# Patient Record
Sex: FEMALE | Race: WHITE | NOT HISPANIC OR LATINO | Employment: OTHER | URBAN - NONMETROPOLITAN AREA
[De-identification: names, ages, dates, MRNs, and addresses within clinical notes are randomized per-mention and may not be internally consistent; named-entity substitution may affect disease eponyms.]

---

## 2023-05-17 ENCOUNTER — OFFICE VISIT (OUTPATIENT)
Dept: PRIMARY CARE | Facility: CLINIC | Age: 73
End: 2023-05-17
Payer: COMMERCIAL

## 2023-05-17 VITALS
TEMPERATURE: 97.6 F | DIASTOLIC BLOOD PRESSURE: 62 MMHG | WEIGHT: 145 LBS | SYSTOLIC BLOOD PRESSURE: 108 MMHG | BODY MASS INDEX: 26.1 KG/M2 | OXYGEN SATURATION: 100 % | HEART RATE: 79 BPM

## 2023-05-17 DIAGNOSIS — M25.512 ACUTE PAIN OF LEFT SHOULDER: Primary | ICD-10-CM
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
## 2023-05-17 DIAGNOSIS — M79.18 ABDOMINAL MUSCLE PAIN: ICD-10-CM
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
## 2023-05-17 PROCEDURE — 99214 OFFICE O/P EST MOD 30 MIN: CPT | Performed by: FAMILY MEDICINE

## 2023-05-17 PROCEDURE — 1036F TOBACCO NON-USER: CPT | Performed by: FAMILY MEDICINE

## 2023-05-17 PROCEDURE — 1159F MED LIST DOCD IN RCRD: CPT | Performed by: FAMILY MEDICINE

## 2023-05-17 RX ORDER — LANCETS 33 GAUGE
EACH MISCELLANEOUS 2 TIMES DAILY
COMMUNITY
Start: 2023-01-31

## 2023-05-17 RX ORDER — LORATADINE 10 MG/1
10 TABLET ORAL DAILY
COMMUNITY
End: 2023-08-02 | Stop reason: SDUPTHER

## 2023-05-17 RX ORDER — CYCLOBENZAPRINE HCL 5 MG
5 TABLET ORAL DAILY PRN
COMMUNITY
Start: 2023-03-07

## 2023-05-17 RX ORDER — BLOOD-GLUCOSE METER
EACH MISCELLANEOUS 2 TIMES DAILY
COMMUNITY
Start: 2016-11-14 | End: 2023-06-16 | Stop reason: SDUPTHER

## 2023-05-17 RX ORDER — ZINC SULFATE 50(220)MG
220 CAPSULE ORAL
COMMUNITY
End: 2023-05-17 | Stop reason: ALTCHOICE

## 2023-05-17 RX ORDER — METFORMIN HYDROCHLORIDE 500 MG/1
1000 TABLET ORAL
COMMUNITY
Start: 2016-07-08 | End: 2023-07-07 | Stop reason: SDUPTHER

## 2023-05-17 RX ORDER — DIPHENHYDRAMINE HCL 25 MG
25 TABLET ORAL EVERY 6 HOURS PRN
COMMUNITY

## 2023-05-17 RX ORDER — BENZONATATE 200 MG/1
200 CAPSULE ORAL EVERY 8 HOURS PRN
COMMUNITY

## 2023-05-17 RX ORDER — FLUTICASONE PROPIONATE 50 MCG
1 SPRAY, SUSPENSION (ML) NASAL 2 TIMES DAILY
COMMUNITY
Start: 2013-02-26 | End: 2024-03-29 | Stop reason: SDUPTHER

## 2023-05-17 RX ORDER — LUBIPROSTONE 24 UG/1
1 CAPSULE ORAL
COMMUNITY
Start: 2022-04-26

## 2023-05-17 RX ORDER — PROMETHAZINE HYDROCHLORIDE 12.5 MG/1
TABLET ORAL
COMMUNITY
Start: 2022-11-28

## 2023-05-17 ASSESSMENT — ENCOUNTER SYMPTOMS
RECTAL PAIN: 0
ACTIVITY CHANGE: 0
EYE DISCHARGE: 0
BACK PAIN: 1
DIZZINESS: 0
NERVOUS/ANXIOUS: 0
SORE THROAT: 0
DIARRHEA: 0
UNEXPECTED WEIGHT CHANGE: 0
SINUS PRESSURE: 0
JOINT SWELLING: 0
LIGHT-HEADEDNESS: 0
SHORTNESS OF BREATH: 0
SLEEP DISTURBANCE: 0
ARTHRALGIAS: 1
FEVER: 0
WEAKNESS: 0
MYALGIAS: 0
EYE ITCHING: 0
NUMBNESS: 0
WHEEZING: 0
NAUSEA: 0
DYSURIA: 0
FLANK PAIN: 0
VOMITING: 0
RHINORRHEA: 0
APPETITE CHANGE: 0
PALPITATIONS: 0
NECK PAIN: 1
COUGH: 0
HEADACHES: 0
HEMATURIA: 0
DYSPHORIC MOOD: 0
ABDOMINAL PAIN: 1

## 2023-05-17 NOTE — PATIENT INSTRUCTIONS
"FOLLOW UP IF YOU NEED TO   USE ISE ON THE SHOULDER/COULD GO TO HEAT ON 20\" THEN OFF THREE TIMES DAILY    FOLLOW WITH DR MAHAJAN  DUE TO THE PAIN IN YOUR SHOULDER   "
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
## 2023-06-16 DIAGNOSIS — E11.9 TYPE 2 DIABETES MELLITUS WITHOUT COMPLICATION, WITHOUT LONG-TERM CURRENT USE OF INSULIN (MULTI): Primary | ICD-10-CM

## 2023-06-16 RX ORDER — BLOOD-GLUCOSE METER
EACH MISCELLANEOUS
Qty: 60 STRIP | Refills: 3 | Status: SHIPPED | OUTPATIENT
Start: 2023-06-16

## 2023-07-07 DIAGNOSIS — E11.9 TYPE 2 DIABETES MELLITUS WITHOUT COMPLICATION, WITHOUT LONG-TERM CURRENT USE OF INSULIN (MULTI): Primary | ICD-10-CM

## 2023-07-07 RX ORDER — METFORMIN HYDROCHLORIDE 500 MG/1
2500 TABLET ORAL 2 TIMES DAILY
Qty: 450 TABLET | Refills: 1 | Status: SHIPPED | OUTPATIENT
Start: 2023-07-07 | End: 2024-01-31 | Stop reason: SDUPTHER

## 2023-08-02 DIAGNOSIS — T78.40XD ALLERGY, SUBSEQUENT ENCOUNTER: Primary | ICD-10-CM

## 2023-08-02 RX ORDER — LORATADINE 10 MG/1
10 TABLET ORAL DAILY
Qty: 90 TABLET | Refills: 0 | Status: SHIPPED | OUTPATIENT
Start: 2023-08-02 | End: 2024-04-11 | Stop reason: SDUPTHER

## 2023-08-23 ENCOUNTER — APPOINTMENT (OUTPATIENT)
Dept: PRIMARY CARE | Facility: CLINIC | Age: 73
End: 2023-08-23
Payer: COMMERCIAL

## 2023-08-24 ENCOUNTER — OFFICE VISIT (OUTPATIENT)
Dept: PRIMARY CARE | Facility: CLINIC | Age: 73
End: 2023-08-24
Payer: COMMERCIAL

## 2023-08-24 VITALS
TEMPERATURE: 96.7 F | HEART RATE: 87 BPM | BODY MASS INDEX: 25.92 KG/M2 | WEIGHT: 144 LBS | OXYGEN SATURATION: 95 % | SYSTOLIC BLOOD PRESSURE: 130 MMHG | DIASTOLIC BLOOD PRESSURE: 80 MMHG

## 2023-08-24 DIAGNOSIS — M54.50 ACUTE BILATERAL LOW BACK PAIN WITHOUT SCIATICA: ICD-10-CM

## 2023-08-24 LAB
POC APPEARANCE, URINE: CLEAR
POC BILIRUBIN, URINE: ABNORMAL
POC BLOOD, URINE: NEGATIVE
POC COLOR, URINE: YELLOW
POC GLUCOSE, URINE: NEGATIVE MG/DL
POC KETONES, URINE: NEGATIVE MG/DL
POC LEUKOCYTES, URINE: NEGATIVE
POC NITRITE,URINE: NEGATIVE
POC PH, URINE: 5.5 PH
POC PROTEIN, URINE: ABNORMAL MG/DL
POC SPECIFIC GRAVITY, URINE: >=1.03
POC UROBILINOGEN, URINE: 1 EU/DL

## 2023-08-24 PROCEDURE — 81003 URINALYSIS AUTO W/O SCOPE: CPT

## 2023-08-24 PROCEDURE — 1036F TOBACCO NON-USER: CPT

## 2023-08-24 PROCEDURE — 99214 OFFICE O/P EST MOD 30 MIN: CPT

## 2023-08-24 PROCEDURE — 1125F AMNT PAIN NOTED PAIN PRSNT: CPT

## 2023-08-24 PROCEDURE — 1159F MED LIST DOCD IN RCRD: CPT

## 2023-08-24 RX ORDER — AMLODIPINE BESYLATE 2.5 MG/1
2.5 TABLET ORAL DAILY
COMMUNITY

## 2023-08-24 RX ORDER — MELOXICAM 7.5 MG/1
7.5 TABLET ORAL DAILY
Qty: 14 TABLET | Refills: 0 | Status: SHIPPED | OUTPATIENT
Start: 2023-08-24 | End: 2023-09-07
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
## 2023-08-24 NOTE — PATIENT INSTRUCTIONS
Continue muscle relaxant  Meloxicam every day for pain relief  As needed Aleve as well    Thank you for coming in today, if any questions or concerns arise, please call my office.   Jose L Lunsford, MILTON-CNP

## 2023-09-12 ENCOUNTER — OFFICE VISIT (OUTPATIENT)
Dept: PRIMARY CARE | Facility: CLINIC | Age: 73
End: 2023-09-12
Payer: COMMERCIAL

## 2023-09-12 VITALS
TEMPERATURE: 97.5 F | OXYGEN SATURATION: 97 % | HEART RATE: 84 BPM | SYSTOLIC BLOOD PRESSURE: 114 MMHG | DIASTOLIC BLOOD PRESSURE: 66 MMHG | WEIGHT: 144 LBS | BODY MASS INDEX: 24.74 KG/M2

## 2023-09-12 DIAGNOSIS — M54.50 CHRONIC BILATERAL LOW BACK PAIN WITHOUT SCIATICA: ICD-10-CM

## 2023-09-12 DIAGNOSIS — R30.0 DYSURIA: Primary | ICD-10-CM

## 2023-09-12 DIAGNOSIS — G89.29 CHRONIC BILATERAL LOW BACK PAIN WITHOUT SCIATICA: ICD-10-CM

## 2023-09-12 LAB
POC APPEARANCE, URINE: CLEAR
POC BILIRUBIN, URINE: NEGATIVE
POC BLOOD, URINE: NEGATIVE
POC COLOR, URINE: YELLOW
POC GLUCOSE, URINE: ABNORMAL MG/DL
POC KETONES, URINE: ABNORMAL MG/DL
POC LEUKOCYTES, URINE: NEGATIVE
POC NITRITE,URINE: NEGATIVE
POC PH, URINE: 5.5 PH
POC PROTEIN, URINE: NEGATIVE MG/DL
POC SPECIFIC GRAVITY, URINE: >=1.03
POC UROBILINOGEN, URINE: 0.2 EU/DL

## 2023-09-12 PROCEDURE — 1125F AMNT PAIN NOTED PAIN PRSNT: CPT | Performed by: FAMILY MEDICINE

## 2023-09-12 PROCEDURE — 81003 URINALYSIS AUTO W/O SCOPE: CPT | Performed by: FAMILY MEDICINE

## 2023-09-12 PROCEDURE — 99214 OFFICE O/P EST MOD 30 MIN: CPT | Performed by: FAMILY MEDICINE

## 2023-09-12 PROCEDURE — 1036F TOBACCO NON-USER: CPT | Performed by: FAMILY MEDICINE

## 2023-09-12 PROCEDURE — 1159F MED LIST DOCD IN RCRD: CPT | Performed by: FAMILY MEDICINE

## 2023-09-12 RX ORDER — CALCIUM CARBONATE 200(500)MG
TABLET,CHEWABLE ORAL
COMMUNITY

## 2023-09-12 RX ORDER — PROPYLENE GLYCOL 0.06 MG/ML
SOLUTION/ DROPS OPHTHALMIC
COMMUNITY
Start: 2023-07-06

## 2023-09-12 ASSESSMENT — ENCOUNTER SYMPTOMS
EYE ITCHING: 0
SINUS PRESSURE: 0
COUGH: 0
NAUSEA: 0
EYE DISCHARGE: 0
VOMITING: 0
DIARRHEA: 0
WEAKNESS: 0
SLEEP DISTURBANCE: 0
FEVER: 0
DIZZINESS: 0
WHEEZING: 0
HEADACHES: 0
ABDOMINAL PAIN: 0
NERVOUS/ANXIOUS: 0
LIGHT-HEADEDNESS: 0
PALPITATIONS: 0
FLANK PAIN: 0
HEMATURIA: 0
ARTHRALGIAS: 0
UNEXPECTED WEIGHT CHANGE: 0
NUMBNESS: 0
APPETITE CHANGE: 0
RHINORRHEA: 0
DYSURIA: 0
MYALGIAS: 0
JOINT SWELLING: 0
BLOOD IN STOOL: 0
DYSPHORIC MOOD: 0
SORE THROAT: 0
CONSTIPATION: 0
ACTIVITY CHANGE: 0
SHORTNESS OF BREATH: 0
ABDOMINAL DISTENTION: 1
BACK PAIN: 1
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
## 2023-10-04 ENCOUNTER — OFFICE VISIT (OUTPATIENT)
Dept: PRIMARY CARE | Facility: CLINIC | Age: 73
End: 2023-10-04
Payer: COMMERCIAL

## 2023-10-04 VITALS
TEMPERATURE: 97.5 F | WEIGHT: 142 LBS | HEART RATE: 84 BPM | SYSTOLIC BLOOD PRESSURE: 148 MMHG | BODY MASS INDEX: 24.39 KG/M2 | OXYGEN SATURATION: 96 % | DIASTOLIC BLOOD PRESSURE: 62 MMHG

## 2023-10-04 DIAGNOSIS — R82.90 ABNORMAL URINE FINDINGS: ICD-10-CM

## 2023-10-04 DIAGNOSIS — R81 GLYCOSURIA: Primary | ICD-10-CM

## 2023-10-04 LAB
POC APPEARANCE, URINE: CLEAR
POC BILIRUBIN, URINE: NEGATIVE
POC BLOOD, URINE: NEGATIVE
POC COLOR, URINE: YELLOW
POC GLUCOSE, URINE: ABNORMAL MG/DL
POC KETONES, URINE: ABNORMAL MG/DL
POC LEUKOCYTES, URINE: NEGATIVE
POC NITRITE,URINE: NEGATIVE
POC PH, URINE: 5.5 PH
POC PROTEIN, URINE: NEGATIVE MG/DL
POC SPECIFIC GRAVITY, URINE: 1.02
POC UROBILINOGEN, URINE: 0.2 EU/DL

## 2023-10-04 PROCEDURE — 81002 URINALYSIS NONAUTO W/O SCOPE: CPT | Performed by: FAMILY MEDICINE

## 2023-10-04 PROCEDURE — 99213 OFFICE O/P EST LOW 20 MIN: CPT | Performed by: FAMILY MEDICINE

## 2023-10-04 PROCEDURE — 1125F AMNT PAIN NOTED PAIN PRSNT: CPT | Performed by: FAMILY MEDICINE

## 2023-10-04 PROCEDURE — 1159F MED LIST DOCD IN RCRD: CPT | Performed by: FAMILY MEDICINE

## 2023-10-04 PROCEDURE — 1036F TOBACCO NON-USER: CPT | Performed by: FAMILY MEDICINE

## 2023-10-04 RX ORDER — OMEPRAZOLE 20 MG/1
CAPSULE, DELAYED RELEASE ORAL
COMMUNITY
Start: 2023-10-02

## 2023-10-04 RX ORDER — DICYCLOMINE HYDROCHLORIDE 20 MG/1
20 TABLET ORAL DAILY PRN
COMMUNITY
Start: 2023-09-27 | End: 2024-09-26

## 2023-10-04 NOTE — PROGRESS NOTES
"Subjective   Patient ID: Veronique Garcia is a 73 y.o. female who presents for Follow-up (Follow up from Dr. Roger.  Was told something was found in her urine (\"calcium or something like that\")).    HPI URINE CHECKED TODAY IS CLEAR     Review of SystemsNO INTERVAL HISTORICAL CHANGES IN ANY OF THE 12 SYSTEMS REVIEWED OTHER THAN NEEDS TO RECHECK MY URINE AND SHE HAD NO DYSURIA     Objective   /62   Pulse 84   Temp 36.4 °C (97.5 °F)   Wt 64.4 kg (142 lb)   SpO2 96%   BMI 24.39 kg/m²     Physical ExamSINCE RECENT VISIT NO INTERVAL PHYSICAL CHANGES IN ANY OF THE 12 SYSTEMS REVIEWED OTHER THAN SHE IS CONFUSED LIKE USUAL WITH FLIGHT OF IDEAS /NON COMPLETION OF SENTENCES  NO FLANK PAIN NO ABNORMAL UA      Assessment/Plan   Diagnoses and all orders for this visit:  Glycosuria  Abnormal urine findings  -     POCT UA (nonautomated) manually resulted         "

## 2024-01-10 ENCOUNTER — APPOINTMENT (OUTPATIENT)
Dept: PRIMARY CARE | Facility: CLINIC | Age: 74
End: 2024-01-10
Payer: COMMERCIAL

## 2024-01-31 DIAGNOSIS — E11.9 TYPE 2 DIABETES MELLITUS WITHOUT COMPLICATION, WITHOUT LONG-TERM CURRENT USE OF INSULIN (MULTI): ICD-10-CM

## 2024-01-31 RX ORDER — METFORMIN HYDROCHLORIDE 500 MG/1
2500 TABLET ORAL 2 TIMES DAILY
Qty: 450 TABLET | Refills: 1 | Status: SHIPPED | OUTPATIENT
Start: 2024-01-31

## 2024-02-13 ENCOUNTER — OFFICE VISIT (OUTPATIENT)
Dept: PRIMARY CARE | Facility: CLINIC | Age: 74
End: 2024-02-13
Payer: COMMERCIAL

## 2024-02-13 VITALS
DIASTOLIC BLOOD PRESSURE: 60 MMHG | OXYGEN SATURATION: 97 % | TEMPERATURE: 97.2 F | HEART RATE: 87 BPM | WEIGHT: 141 LBS | SYSTOLIC BLOOD PRESSURE: 110 MMHG | BODY MASS INDEX: 24.22 KG/M2

## 2024-02-13 DIAGNOSIS — E11.9 TYPE 2 DIABETES MELLITUS WITHOUT COMPLICATION, WITHOUT LONG-TERM CURRENT USE OF INSULIN (MULTI): ICD-10-CM

## 2024-02-13 LAB — POC HEMOGLOBIN A1C: 9.9 % (ref 4.2–6.5)

## 2024-02-13 PROCEDURE — 99214 OFFICE O/P EST MOD 30 MIN: CPT | Performed by: FAMILY MEDICINE

## 2024-02-13 PROCEDURE — 1159F MED LIST DOCD IN RCRD: CPT | Performed by: FAMILY MEDICINE

## 2024-02-13 PROCEDURE — 3074F SYST BP LT 130 MM HG: CPT | Performed by: FAMILY MEDICINE

## 2024-02-13 PROCEDURE — 1125F AMNT PAIN NOTED PAIN PRSNT: CPT | Performed by: FAMILY MEDICINE

## 2024-02-13 PROCEDURE — 3078F DIAST BP <80 MM HG: CPT | Performed by: FAMILY MEDICINE

## 2024-02-13 PROCEDURE — 1036F TOBACCO NON-USER: CPT | Performed by: FAMILY MEDICINE

## 2024-02-13 PROCEDURE — 83036 HEMOGLOBIN GLYCOSYLATED A1C: CPT | Performed by: FAMILY MEDICINE

## 2024-02-13 RX ORDER — POLYETHYLENE GLYCOL 3350 17 G/17G
POWDER, FOR SOLUTION ORAL
COMMUNITY
Start: 2023-10-05

## 2024-02-15 ASSESSMENT — ENCOUNTER SYMPTOMS
DIARRHEA: 0
RHINORRHEA: 0
CONSTIPATION: 0
BLOOD IN STOOL: 0
EYE DISCHARGE: 0
UNEXPECTED WEIGHT CHANGE: 0
WEAKNESS: 0
FEVER: 0
HEADACHES: 0
COUGH: 0
ACTIVITY CHANGE: 0
NERVOUS/ANXIOUS: 0
DIZZINESS: 0
SHORTNESS OF BREATH: 0
SORE THROAT: 0
FLANK PAIN: 0
VOMITING: 0
PALPITATIONS: 0
DYSURIA: 0
NAUSEA: 0
ARTHRALGIAS: 0
ABDOMINAL PAIN: 0
SLEEP DISTURBANCE: 0
SINUS PRESSURE: 0
HEMATURIA: 0
DYSPHORIC MOOD: 0
MYALGIAS: 0
WHEEZING: 0
LIGHT-HEADEDNESS: 0
APPETITE CHANGE: 0
NUMBNESS: 0
JOINT SWELLING: 0
CONFUSION: 1
EYE ITCHING: 0

## 2024-03-12 ENCOUNTER — TELEPHONE (OUTPATIENT)
Dept: PRIMARY CARE | Facility: CLINIC | Age: 74
End: 2024-03-12

## 2024-03-13 DIAGNOSIS — E11.9 TYPE 2 DIABETES MELLITUS WITHOUT COMPLICATION, WITHOUT LONG-TERM CURRENT USE OF INSULIN (MULTI): Primary | ICD-10-CM

## 2024-03-29 DIAGNOSIS — J30.9 ALLERGIC RHINITIS, UNSPECIFIED SEASONALITY, UNSPECIFIED TRIGGER: Primary | ICD-10-CM

## 2024-03-29 RX ORDER — FLUTICASONE PROPIONATE 50 MCG
1 SPRAY, SUSPENSION (ML) NASAL 2 TIMES DAILY
Qty: 16 G | Refills: 2 | Status: SHIPPED | OUTPATIENT
Start: 2024-03-29

## 2024-04-11 DIAGNOSIS — T78.40XD ALLERGY, SUBSEQUENT ENCOUNTER: ICD-10-CM

## 2024-04-12 RX ORDER — LORATADINE 10 MG/1
10 TABLET ORAL DAILY
Qty: 90 TABLET | Refills: 3 | Status: SHIPPED | OUTPATIENT
Start: 2024-04-12

## 2024-04-25 ENCOUNTER — NUTRITION (OUTPATIENT)
Dept: NUTRITION | Facility: HOSPITAL | Age: 74
End: 2024-04-25
Payer: COMMERCIAL

## 2024-04-25 VITALS — BODY MASS INDEX: 24.31 KG/M2 | WEIGHT: 141.5 LBS

## 2024-04-25 DIAGNOSIS — E11.9 TYPE 2 DIABETES MELLITUS WITHOUT COMPLICATION, WITHOUT LONG-TERM CURRENT USE OF INSULIN (MULTI): Primary | ICD-10-CM

## 2024-04-25 PROCEDURE — 97802 MEDICAL NUTRITION INDIV IN: CPT | Performed by: FAMILY MEDICINE

## 2024-04-25 NOTE — PATIENT INSTRUCTIONS
1.) Follow a Carbohydrate counting diet of no more than 3 servings (45g) of carbohydrates per meal and 1-1.5 servings (15-20g) per snack.   2.) Consume carbohydrates with a higher fiber content like whole grains, beans, nuts, oats, lentils, berries, non-starchy vegetables, leafy greens, fruits with edible skin, and seeds.   3.) Pair Carbohydrate foods with proteins like lean meats, nuts, legumes, eggs, peanut butter, and cottage cheese, greek yogurt.   4. Decrease sugar sweetened beverages and substitute un-sweet/no sugar beverages like water, un-sweet tea, or sugar free beverages.

## 2024-05-15 ENCOUNTER — OFFICE VISIT (OUTPATIENT)
Dept: PRIMARY CARE | Facility: CLINIC | Age: 74
End: 2024-05-15
Payer: COMMERCIAL

## 2024-05-15 VITALS
TEMPERATURE: 98 F | HEART RATE: 92 BPM | BODY MASS INDEX: 24.56 KG/M2 | SYSTOLIC BLOOD PRESSURE: 130 MMHG | WEIGHT: 143 LBS | OXYGEN SATURATION: 97 % | DIASTOLIC BLOOD PRESSURE: 70 MMHG

## 2024-05-15 DIAGNOSIS — E11.9 TYPE 2 DIABETES MELLITUS WITHOUT COMPLICATION, WITHOUT LONG-TERM CURRENT USE OF INSULIN (MULTI): ICD-10-CM

## 2024-05-15 DIAGNOSIS — J40 BRONCHITIS: Primary | ICD-10-CM

## 2024-05-15 LAB — POC HEMOGLOBIN A1C: 10.4 % (ref 4.2–6.5)

## 2024-05-15 PROCEDURE — 3078F DIAST BP <80 MM HG: CPT | Performed by: FAMILY MEDICINE

## 2024-05-15 PROCEDURE — 3075F SYST BP GE 130 - 139MM HG: CPT | Performed by: FAMILY MEDICINE

## 2024-05-15 PROCEDURE — 99214 OFFICE O/P EST MOD 30 MIN: CPT | Performed by: FAMILY MEDICINE

## 2024-05-15 PROCEDURE — 1159F MED LIST DOCD IN RCRD: CPT | Performed by: FAMILY MEDICINE

## 2024-05-15 PROCEDURE — 1036F TOBACCO NON-USER: CPT | Performed by: FAMILY MEDICINE

## 2024-05-15 PROCEDURE — 83036 HEMOGLOBIN GLYCOSYLATED A1C: CPT | Performed by: FAMILY MEDICINE

## 2024-05-15 RX ORDER — MONTELUKAST SODIUM 4 MG/1
4 TABLET, CHEWABLE ORAL NIGHTLY
Qty: 30 TABLET | Refills: 5 | Status: SHIPPED | OUTPATIENT
Start: 2024-05-15 | End: 2024-05-16 | Stop reason: SDUPTHER

## 2024-05-15 ASSESSMENT — ENCOUNTER SYMPTOMS
ARTHRALGIAS: 0
DYSURIA: 0
WEAKNESS: 0
DIZZINESS: 0
APPETITE CHANGE: 0
WHEEZING: 1
DIARRHEA: 0
EYE DISCHARGE: 0
FEVER: 0
HEADACHES: 0
NAUSEA: 0
NERVOUS/ANXIOUS: 0
NUMBNESS: 0
EYE ITCHING: 0
COUGH: 0
CONSTIPATION: 0
RHINORRHEA: 0
ACTIVITY CHANGE: 0
SHORTNESS OF BREATH: 1
MYALGIAS: 0
PALPITATIONS: 0
SORE THROAT: 0
VOMITING: 0
SINUS PRESSURE: 1
UNEXPECTED WEIGHT CHANGE: 0
LIGHT-HEADEDNESS: 0
DYSPHORIC MOOD: 0
HEMATURIA: 0
JOINT SWELLING: 0
SLEEP DISTURBANCE: 0
ABDOMINAL PAIN: 0
BLOOD IN STOOL: 0
FLANK PAIN: 0

## 2024-05-15 NOTE — PATIENT INSTRUCTIONS
Continue great efforts at lower simple carbohydrates   Exercise daily     SEE HERE IN 3 MONTHS FOR THE A1C    Pt here with complaints of throat burning and intermittent sharp pain after eating or drinking. Pt states she is able to swallow food and fluids without issue but states she gets pain shortly afterwards. Pt reports pain started yesterday. Pt is rating pain 5/10 on verbal pain scale.

## 2024-05-15 NOTE — PROGRESS NOTES
Subjective   Patient ID: Veronique Garcia is a 74 y.o. female who presents for Sinus Problem (Nasal drainage, sore throat, ear ache, cough, headaches- initial symptoms started about 1 month ago and some symptoms starting within the last day or so.) and Diabetes (Last A1C was completed on 2/13/24 resulting in 9.9.  Today in office 10.4.).    HPI PATIENT HAS UNCONTROLLED DIABETES   SEEING A DIETICIAN FOR THIS   Measuring foods and different starches like chickpeas     Review of Systems   Constitutional:  Negative for activity change, appetite change, fever and unexpected weight change.   HENT:  Positive for congestion, postnasal drip and sinus pressure. Negative for ear pain, rhinorrhea and sore throat.    Eyes:  Negative for discharge, itching and visual disturbance.   Respiratory:  Positive for shortness of breath and wheezing. Negative for cough.    Cardiovascular:  Negative for chest pain, palpitations and leg swelling.   Gastrointestinal:  Negative for abdominal pain, blood in stool, constipation, diarrhea, nausea and vomiting.   Endocrine: Negative for cold intolerance, heat intolerance and polyuria.   Genitourinary:  Negative for dysuria, flank pain and hematuria.   Musculoskeletal:  Negative for arthralgias, gait problem, joint swelling and myalgias.   Skin:  Negative for rash.   Allergic/Immunologic: Negative for environmental allergies and food allergies.   Neurological:  Negative for dizziness, syncope, weakness, light-headedness, numbness and headaches.   Psychiatric/Behavioral:  Negative for dysphoric mood and sleep disturbance. The patient is not nervous/anxious.        Objective   /70   Pulse 92   Temp 36.7 °C (98 °F)   Wt 64.9 kg (143 lb)   SpO2 97%   BMI 24.56 kg/m²     Physical Exam  Vitals and nursing note reviewed.   Constitutional:       Appearance: Normal appearance.   HENT:      Head: Normocephalic.      Mouth/Throat:      Mouth: Mucous membranes are moist.   Cardiovascular:       Rate and Rhythm: Normal rate and regular rhythm.      Pulses: Normal pulses.      Heart sounds: Normal heart sounds. No murmur heard.     No friction rub. No gallop.   Pulmonary:      Effort: Pulmonary effort is normal. No respiratory distress.      Breath sounds: Wheezing and rhonchi present.   Abdominal:      General: Bowel sounds are normal. There is no distension.      Palpations: Abdomen is soft.      Tenderness: There is no abdominal tenderness.   Musculoskeletal:         General: No deformity. Normal range of motion.   Skin:     General: Skin is warm and dry.      Capillary Refill: Capillary refill takes less than 2 seconds.   Neurological:      General: No focal deficit present.      Mental Status: She is alert and oriented to person, place, and time.   Psychiatric:         Mood and Affect: Mood normal.         Assessment/Plan   Problem List Items Addressed This Visit             ICD-10-CM    Type 2 diabetes mellitus without complication, without long-term current use of insulin (Multi) E11.9    Relevant Orders    POCT glycosylated hemoglobin (Hb A1C) manually resulted (Completed)    Bronchitis - Primary J40    Relevant Medications    montelukast (Singulair) 4 mg chewable tablet

## 2024-05-16 DIAGNOSIS — J40 BRONCHITIS: ICD-10-CM

## 2024-05-17 RX ORDER — MONTELUKAST SODIUM 4 MG/1
4 TABLET, CHEWABLE ORAL NIGHTLY
Qty: 30 TABLET | Refills: 5 | Status: SHIPPED | OUTPATIENT
Start: 2024-05-17 | End: 2024-11-13

## 2024-05-21 ENCOUNTER — APPOINTMENT (OUTPATIENT)
Dept: PRIMARY CARE | Facility: CLINIC | Age: 74
End: 2024-05-21
Payer: COMMERCIAL

## 2024-05-24 ENCOUNTER — OFFICE VISIT (OUTPATIENT)
Dept: PRIMARY CARE | Facility: CLINIC | Age: 74
End: 2024-05-24
Payer: COMMERCIAL

## 2024-05-24 VITALS
HEART RATE: 81 BPM | SYSTOLIC BLOOD PRESSURE: 128 MMHG | WEIGHT: 141 LBS | BODY MASS INDEX: 24.22 KG/M2 | DIASTOLIC BLOOD PRESSURE: 68 MMHG | OXYGEN SATURATION: 96 % | TEMPERATURE: 97.5 F

## 2024-05-24 DIAGNOSIS — Z00.8 ENCOUNTER FOR MEDICAL CLEARANCE FOR PATIENT HOLD: Primary | ICD-10-CM

## 2024-05-24 DIAGNOSIS — S02.5XXS CLOSED FRACTURE OF TOOTH, SEQUELA: ICD-10-CM

## 2024-05-24 DIAGNOSIS — E11.9 TYPE 2 DIABETES MELLITUS WITHOUT COMPLICATION, WITHOUT LONG-TERM CURRENT USE OF INSULIN (MULTI): ICD-10-CM

## 2024-05-24 PROBLEM — S02.5XXA CLOSED FRACTURE OF TOOTH: Status: ACTIVE | Noted: 2024-05-24

## 2024-05-24 PROCEDURE — 1036F TOBACCO NON-USER: CPT | Performed by: FAMILY MEDICINE

## 2024-05-24 PROCEDURE — 99214 OFFICE O/P EST MOD 30 MIN: CPT | Performed by: FAMILY MEDICINE

## 2024-05-24 PROCEDURE — 3078F DIAST BP <80 MM HG: CPT | Performed by: FAMILY MEDICINE

## 2024-05-24 PROCEDURE — 3074F SYST BP LT 130 MM HG: CPT | Performed by: FAMILY MEDICINE

## 2024-05-24 PROCEDURE — 1159F MED LIST DOCD IN RCRD: CPT | Performed by: FAMILY MEDICINE

## 2024-05-24 NOTE — PROGRESS NOTES
Subjective   Patient ID: Veronique Garcia is a 74 y.o. female who presents for dental clearance (Seeing [facility] for a tooth extraction.  Unsure date.  Would like clearance form filled out.).    HPI HAS A BROKEN TOOTH THAT NEEDS PULLED URGENTLY  SHE IS UNCONTROLLED DM AT A1C OF 10  HERE FOR CLEARANCE     Review of SystemsSINCE THE  VISIT NO INTERVAL HISTORICAL CHANGES IN ANY OF THE 12 SYSTEMS REVIEWED OTHER THAN THAT WITH THE LOWER LEFT JAW BROKEN TOOTH     Objective   /68   Pulse 81   Temp 36.4 °C (97.5 °F)   Wt 64 kg (141 lb)   SpO2 96%   BMI 24.22 kg/m²     Physical ExamSINCE RECENT VISIT NO INTERVAL PHYSICAL CHANGES IN ANY OF THE 12 SYSTEMS REVIEWED OTHER THAN WITH THE LOWER LEFT JAW BROKEN TOOTH AND THAT NEEDS URGENTLY PULLED     Assessment/Plan   Problem List Items Addressed This Visit             ICD-10-CM    Type 2 diabetes mellitus without complication, without long-term current use of insulin (Multi) E11.9    Closed fracture of tooth S02.5XXA    Encounter for medical clearance for patient hold - Primary Z00.8

## 2024-06-20 ENCOUNTER — NUTRITION (OUTPATIENT)
Dept: NUTRITION | Facility: HOSPITAL | Age: 74
End: 2024-06-20
Payer: COMMERCIAL

## 2024-06-20 DIAGNOSIS — E11.9 TYPE 2 DIABETES MELLITUS WITHOUT COMPLICATION, WITHOUT LONG-TERM CURRENT USE OF INSULIN (MULTI): Primary | ICD-10-CM

## 2024-06-20 PROCEDURE — 97803 MED NUTRITION INDIV SUBSEQ: CPT | Performed by: FAMILY MEDICINE

## 2024-06-20 NOTE — PROGRESS NOTES
Nutrition Assessment   - FOLLOW UP  Reason for Visit:  Veronique Garcia is a 74 y.o. female who presents for Diabetes follow up    Anthropometrics:  Anthropometrics  IBW/kg (Dietitian Calculated): 54.5 kg ([place])  Percent of IBW: 117 %  Weight Change  Weight History / % Weight Change: No significant weight changes per pt chart  Significant Weight Loss: No       Wt Readings from Last 10 Encounters:   05/24/24 64 kg (141 lb)   05/15/24 64.9 kg (143 lb)   04/25/24 64.2 kg (141 lb 8 oz)   02/13/24 64 kg (141 lb)   10/04/23 64.4 kg (142 lb)   09/12/23 65.3 kg (144 lb)   08/24/23 65.3 kg (144 lb)   05/17/23 65.8 kg (145 lb)   01/31/23 62.8 kg (138 lb 7 oz)   10/18/22 64.9 kg (143 lb)       Food And Nutrient Intake:  Food and Nutrient History  Food and Nutrient History: Pt states that she is doing well with the CHO counting diet. She bought a new set of measuring spoons and cups and have been measuring out all of her food to serving size. She also states that she follows alot of the education that we talked about in the intitial assessment and uses it as a recource. A1c as of 5/15 was 10.4 and a previous A1c of 9.9 4 months ago. Pt has a tooth problem, and provider believe A1c can be higher due to this per pt. She is going back in August to have another lab drawn. Pt check BG at least 1x/day, and todays BG was 243 fasting prior to breakfast.  Energy Intake: Good > 75 %  Fluid Intake: Primarily water, 1 glass of homemade lemonade with no added sugar.  Food Allergy: NKFA  Food Intolerance: Milk/dairy  GI Symptoms: none     Food Intake  Meal 1: Breakfast- 1 egg + 1 slice of toast + butter + 1 piece of vila  Meal 2: Lunch- piece of fruit and 1/2 cup of vegetables  Meal 3: Dinner- Last night was a small piece of lasagne with a side salad  Snacks: Snacks- a sweet like cake.    Food Preparation  Cooking: Patient  Grocery Shopping: Patient  Dining Out: More than 3 times a week  Other: Watches serving sizes when she is out to  eat.     Micronutrient Intake  Vitamin Intake: Multivitamin     Medication and Complementary/Alternative Medicine Use  Prescription Medication Use: Metformin, Norvasc, Amitiza, Prilosec    Physical Activities:  Physical Activity  Physical Activity History: Pt reports going to the [facility] to walk on the track. She tries to go every day or walk outside for 30 minutes.  Consistency: Yes     Nutrition Focused Physical Exam:  Subcutaneous Fat Loss  Orbital Fat Pads: Defer (See NFPE from initial visit. No weight changes.)  Muscle Wasting  Temporalis: Defer (See NFPE from initial visit. No weight changes.)      Energy Needs  Estimated Energy Needs  Total Energy Estimated Needs (kCal): 1600 kCal  Method for Estimating Needs: 25kcal/kg  Estimated Fluid Needs  Total Fluid Estimated Needs (mL): 1600 mL  Method for Estimating Needs: 1ml/kcal  Estimated Protein Needs  Total Protein Estimated Needs (g): 58 g  Method for Estimating Needs: 0.8-1.0g/kg actual bodyweight      Nutrition Diagnosis      Nutrition Diagnosis  Patient has Nutrition Diagnosis: Yes  Diagnosis Status (1): Ongoing  Nutrition Diagnosis 1: Excessive carbohydrate intake  Related to (1): physiological causes requiring use of modified carbohydrate intake  As Evidenced by (1): diet recall and A1c of 10.4.  Additional Nutrition Diagnosis: Diagnosis 2  Diagnosis Status (2): Resolved  Nutrition Diagnosis 2: Food and nutrition related knowledge deficit  Related to (2): limited prior nutrition related education  As Evidenced by (2): diet recall and A1c of 10.4.    Nutrition Interventions/Recommendations   Nutrition Prescription  Individualized Nutrition Prescription Provided for : Carbohydrate, Protein, and Fiber modified diet.  Food and Nutrition Delivery  Meals & Snacks: Carbohydrate-modified diet, Fiber-modified diet, Protein-modified diet  Goals: Pt will follow CHO counting/controlled diet, pairing CHO with protien and higher fiber sources of CHO.  Nutrition  Education  Nutrition Education Content: Content related nutrition education  Goals: Patient goals- 1. Continue to follow portion sizes for CHO counting. Consume no more than 45-60g CHO per meals. 2. Reduce sugary sweets to 2x/week following portion sizes.      Nutrition Monitoring and Evaluation   Food/Nutrient Related History Monitoring  Monitoring and Evaluation Plan: Carbohydrate intake, Fiber intake, Protein intake  Estimated protein intake: Estimated protein intake  Criteria: Pt will pair carbohydrate foods with protein foods to reduce BG spike.  Estimated carbohydrate intake: Estimated carbohydrate intake  Criteria: Pt will consume no more than 3-4 servings (45-60g) carbohydrates per meal, and no more then 1-1.5 servings (15-20g) carbohydrates per snack.  Estimated fiber intake: Estimated fiber intake  Criteria: Pt will consume higher fiber carbohydrate food options to reduce BG spike.  Biochemical Data, Medical Tests and Procedures  Monitoring and Evaluation Plan: Glucose/endocrine profile  Glucose/Endocrine Profile: Glucose, casual, Glucose, fasting, Hemoglobin A1c (HgbA1c)  Criteria: Labs WNL      Time Spent  Time spent directly with patient, family or caregiver: 30 minutes  Documentation Time: 15 minutes      Readiness to Change : Good  Level of Understanding : Good  Anticipated Compliant : Good

## 2024-07-10 DIAGNOSIS — E11.9 TYPE 2 DIABETES MELLITUS WITHOUT COMPLICATION, WITHOUT LONG-TERM CURRENT USE OF INSULIN (MULTI): ICD-10-CM

## 2024-07-10 RX ORDER — BLOOD-GLUCOSE METER
EACH MISCELLANEOUS
Qty: 60 STRIP | Refills: 3 | Status: SHIPPED | OUTPATIENT
Start: 2024-07-10

## 2024-08-20 DIAGNOSIS — E11.9 TYPE 2 DIABETES MELLITUS WITHOUT COMPLICATION, WITHOUT LONG-TERM CURRENT USE OF INSULIN (MULTI): ICD-10-CM

## 2024-08-20 RX ORDER — METFORMIN HYDROCHLORIDE 500 MG/1
2500 TABLET ORAL 2 TIMES DAILY
Qty: 450 TABLET | Refills: 1 | Status: SHIPPED | OUTPATIENT
Start: 2024-08-20

## 2024-08-21 ENCOUNTER — APPOINTMENT (OUTPATIENT)
Dept: PRIMARY CARE | Facility: CLINIC | Age: 74
End: 2024-08-21
Payer: COMMERCIAL

## 2024-08-21 VITALS
TEMPERATURE: 96.9 F | WEIGHT: 140 LBS | BODY MASS INDEX: 24.05 KG/M2 | DIASTOLIC BLOOD PRESSURE: 84 MMHG | SYSTOLIC BLOOD PRESSURE: 156 MMHG | OXYGEN SATURATION: 96 % | HEART RATE: 58 BPM

## 2024-08-21 DIAGNOSIS — E11.9 TYPE 2 DIABETES MELLITUS WITHOUT COMPLICATION, WITHOUT LONG-TERM CURRENT USE OF INSULIN (MULTI): ICD-10-CM

## 2024-08-21 LAB — POC HEMOGLOBIN A1C: 9.3 % (ref 4.2–6.5)

## 2024-08-21 PROCEDURE — 3079F DIAST BP 80-89 MM HG: CPT | Performed by: FAMILY MEDICINE

## 2024-08-21 PROCEDURE — 83036 HEMOGLOBIN GLYCOSYLATED A1C: CPT | Performed by: FAMILY MEDICINE

## 2024-08-21 PROCEDURE — 99214 OFFICE O/P EST MOD 30 MIN: CPT | Performed by: FAMILY MEDICINE

## 2024-08-21 PROCEDURE — 1159F MED LIST DOCD IN RCRD: CPT | Performed by: FAMILY MEDICINE

## 2024-08-21 PROCEDURE — 3077F SYST BP >= 140 MM HG: CPT | Performed by: FAMILY MEDICINE

## 2024-08-21 PROCEDURE — 1036F TOBACCO NON-USER: CPT | Performed by: FAMILY MEDICINE

## 2024-08-21 ASSESSMENT — ENCOUNTER SYMPTOMS
HEMATURIA: 0
JOINT SWELLING: 0
VOMITING: 0
APPETITE CHANGE: 0
SINUS PRESSURE: 0
DYSPHORIC MOOD: 0
SLEEP DISTURBANCE: 0
EYE ITCHING: 0
DYSURIA: 0
SORE THROAT: 0
WEAKNESS: 0
ACTIVITY CHANGE: 0
WHEEZING: 0
SHORTNESS OF BREATH: 0
MYALGIAS: 0
DIZZINESS: 0
ARTHRALGIAS: 0
BLOOD IN STOOL: 0
CONSTIPATION: 0
HEADACHES: 0
FEVER: 0
ABDOMINAL PAIN: 0
PALPITATIONS: 0
NERVOUS/ANXIOUS: 0
FLANK PAIN: 0
RHINORRHEA: 0
NAUSEA: 0
EYE DISCHARGE: 0
COUGH: 0
UNEXPECTED WEIGHT CHANGE: 0
DIARRHEA: 0
NUMBNESS: 0
LIGHT-HEADEDNESS: 0

## 2024-08-21 NOTE — PROGRESS NOTES
Subjective   Patient ID: Veronique Garcia is a 74 y.o. female who presents for Diabetes (Last A1C was completed on 5/15/24 resulting in 10.4.  Today in office 9.3.).    HPI SO SLIGHT IMPROVEMENT IN HER A1C TO 9.3   BMI IS 24 AND SHE LOST 3-4 POUNDS   SHE IS NOT VERY ACTIVE   ONLY TAKES METFORMIN   A GLIPTIN  WOULD BE BENEFICIAL FOR HER CARDIAC AND RENAL STATUS     Review of Systems   Constitutional:  Negative for activity change, appetite change, fever and unexpected weight change.   HENT:  Positive for dental problem. Negative for congestion, ear pain, postnasal drip, rhinorrhea, sinus pressure and sore throat.    Eyes:  Negative for discharge, itching and visual disturbance.   Respiratory:  Negative for cough, shortness of breath and wheezing.    Cardiovascular:  Negative for chest pain, palpitations and leg swelling.   Gastrointestinal:  Negative for abdominal pain, blood in stool, constipation, diarrhea, nausea and vomiting.   Endocrine: Negative for cold intolerance, heat intolerance and polyuria.   Genitourinary:  Negative for dysuria, flank pain and hematuria.   Musculoskeletal:  Negative for arthralgias, gait problem, joint swelling and myalgias.   Skin:  Negative for rash.   Allergic/Immunologic: Negative for environmental allergies and food allergies.   Neurological:  Negative for dizziness, syncope, weakness, light-headedness, numbness and headaches.   Psychiatric/Behavioral:  Negative for dysphoric mood and sleep disturbance. The patient is not nervous/anxious.        Objective   /84   Pulse 58   Temp 36.1 °C (96.9 °F)   Wt 63.5 kg (140 lb)   SpO2 96%   BMI 24.05 kg/m²     Physical Exam  Vitals and nursing note reviewed.   Constitutional:       Appearance: Normal appearance.   HENT:      Head: Normocephalic.   Cardiovascular:      Rate and Rhythm: Normal rate and regular rhythm.      Pulses: Normal pulses.      Heart sounds: Normal heart sounds. No murmur heard.     No friction rub. No  gallop.   Pulmonary:      Effort: Pulmonary effort is normal. No respiratory distress.      Breath sounds: Normal breath sounds. No wheezing.   Abdominal:      General: Bowel sounds are normal. There is no distension.      Palpations: Abdomen is soft.      Tenderness: There is no abdominal tenderness.   Musculoskeletal:         General: No deformity. Normal range of motion.   Skin:     General: Skin is warm and dry.      Capillary Refill: Capillary refill takes less than 2 seconds.   Neurological:      General: No focal deficit present.      Mental Status: She is alert and oriented to person, place, and time.   Psychiatric:         Mood and Affect: Mood normal.         Assessment/Plan   Problem List Items Addressed This Visit             ICD-10-CM    Type 2 diabetes mellitus without complication, without long-term current use of insulin (Multi) E11.9    Relevant Medications    SITagliptin phosphate (Januvia) 25 mg tablet    Other Relevant Orders    POCT glycosylated hemoglobin (Hb A1C) manually resulted (Completed)

## 2024-09-12 ENCOUNTER — NUTRITION (OUTPATIENT)
Dept: NUTRITION | Facility: HOSPITAL | Age: 74
End: 2024-09-12
Payer: COMMERCIAL

## 2024-09-12 DIAGNOSIS — E11.9 TYPE 2 DIABETES MELLITUS WITHOUT COMPLICATION, WITHOUT LONG-TERM CURRENT USE OF INSULIN (MULTI): Primary | ICD-10-CM

## 2024-09-12 PROCEDURE — 97803 MED NUTRITION INDIV SUBSEQ: CPT | Performed by: FAMILY MEDICINE

## 2024-09-12 NOTE — PROGRESS NOTES
Nutrition Assessment   Follow Up  Reason for Visit:  Veronique Garcia is a 74 y.o. female who presents for Diabetes (Follow Up)    Anthropometrics:  Anthropometrics  IBW/kg (Dietitian Calculated): 52.3 kg  Percent of IBW: 121 %  Weight Change  Weight History / % Weight Change: No significant weight changes per pt chart      Wt Readings from Last 10 Encounters:   08/21/24 63.5 kg (140 lb)   05/24/24 64 kg (141 lb)   05/15/24 64.9 kg (143 lb)   04/25/24 64.2 kg (141 lb 8 oz)   02/13/24 64 kg (141 lb)   10/04/23 64.4 kg (142 lb)   09/12/23 65.3 kg (144 lb)   08/24/23 65.3 kg (144 lb)   05/17/23 65.8 kg (145 lb)   01/31/23 62.8 kg (138 lb 7 oz)       Food And Nutrient Intake:  Food and Nutrient History  Food and Nutrient History: Pt reports that she is still doing well with measuring out foods and following carb counting. Pt had a decrease in A1c from 10.4 to 9.3 in 3 monhts. Pt sees provider in November to check again. Pt reports fasting BG is usually around 120. Pt started taking Januvia along with metformin after seeing provider in August.  Energy Intake: Good > 75 %  Fluid Intake: Primarily water, 1 glass of homemade lemonade with no added sugar.  GI Symptoms: none     Food Intake  Meal 1: Eggs, toast  Meal 2: Spaghetti, ground beef  Meal 3: Stuffed peppers    Food Preparation  Cooking: Patient  Grocery Shopping: Patient           Medication and Complementary/Alternative Medicine Use  Prescription Medication Use: Metformin, Norvasc, Amitiza, Prilosec, Januvia     Energy Needs  Estimated Energy Needs  Total Energy Estimated Needs (kCal): 1600 kCal  Method for Estimating Needs: 25kcal/kg  Estimated Fluid Needs  Total Fluid Estimated Needs (mL): 1600 mL  Method for Estimating Needs: 1ml/kcal  Estimated Protein Needs  Total Protein Estimated Needs (g): 58 g  Method for Estimating Needs: 0.8-1.0g/kg actual bodyweight      Nutrition Diagnosis      Nutrition Diagnosis  Patient has Nutrition Diagnosis: Yes  Diagnosis  Status (1): Ongoing  Nutrition Diagnosis 1: Excessive carbohydrate intake (Improving)  Related to (1): physiological causes requiring use of modified carbohydrate intake  As Evidenced by (1): diet recall and A1c of 9.3.    Nutrition Interventions/Recommendations   Nutrition Prescription  Individualized Nutrition Prescription Provided for : Carbohydrate, Protein, and Fiber modified diet.  Food and Nutrition Delivery  Meals & Snacks: Carbohydrate-modified diet, Fiber-modified diet, Protein-modified diet  Goals: Pt will follow CHO counting/controlled diet, pairing CHO with protien and higher fiber sources of CHO.  Nutrition Education  Nutrition Education Content: Content related nutrition education  Goals: Patient goals- 1. Continue to follow portion sizes for CHO counting. Consume no more than 45-60g CHO per meals. 2. Reduce sugary sweets to 2x/week following portion sizes.      Nutrition Monitoring and Evaluation   Food/Nutrient Related History Monitoring  Monitoring and Evaluation Plan: Carbohydrate intake, Fiber intake, Protein intake  Estimated protein intake: Estimated protein intake  Criteria: Pt will pair carbohydrate foods with protein foods to reduce BG spike.  Estimated carbohydrate intake: Estimated carbohydrate intake  Criteria: Pt will consume no more than 3-4 servings (45-60g) carbohydrates per meal, and no more then 1-1.5 servings (15-20g) carbohydrates per snack.  Criteria: Pt will consume higher fiber carbohydrate food options to reduce BG spike.  Biochemical Data, Medical Tests and Procedures  Monitoring and Evaluation Plan: Glucose/endocrine profile  Glucose/Endocrine Profile: Glucose, casual, Hemoglobin A1c (HgbA1c), Glucose, fasting  Criteria: Labs WNL      Time Spent  Time spent directly with patient, family or caregiver: 35 minutes  Documentation Time: 20 minutes        Readiness to Change : Good  Level of Understanding : Good  Anticipated Compliant : Good

## 2024-10-02 ENCOUNTER — APPOINTMENT (OUTPATIENT)
Dept: PRIMARY CARE | Facility: CLINIC | Age: 74
End: 2024-10-02
Payer: COMMERCIAL

## 2024-10-08 ENCOUNTER — APPOINTMENT (OUTPATIENT)
Dept: PRIMARY CARE | Facility: CLINIC | Age: 74
End: 2024-10-08
Payer: COMMERCIAL

## 2024-10-08 VITALS
WEIGHT: 142 LBS | OXYGEN SATURATION: 97 % | SYSTOLIC BLOOD PRESSURE: 130 MMHG | DIASTOLIC BLOOD PRESSURE: 72 MMHG | BODY MASS INDEX: 24.39 KG/M2 | HEART RATE: 95 BPM | TEMPERATURE: 97.7 F

## 2024-10-08 DIAGNOSIS — J01.00 ACUTE NON-RECURRENT MAXILLARY SINUSITIS: Primary | ICD-10-CM

## 2024-10-08 PROCEDURE — 1159F MED LIST DOCD IN RCRD: CPT | Performed by: FAMILY MEDICINE

## 2024-10-08 PROCEDURE — 99214 OFFICE O/P EST MOD 30 MIN: CPT | Performed by: FAMILY MEDICINE

## 2024-10-08 PROCEDURE — 3075F SYST BP GE 130 - 139MM HG: CPT | Performed by: FAMILY MEDICINE

## 2024-10-08 PROCEDURE — 3078F DIAST BP <80 MM HG: CPT | Performed by: FAMILY MEDICINE

## 2024-10-08 RX ORDER — DOXYCYCLINE 100 MG/1
100 CAPSULE ORAL 2 TIMES DAILY
Qty: 20 CAPSULE | Refills: 0 | Status: SHIPPED | OUTPATIENT
Start: 2024-10-08 | End: 2024-10-18

## 2024-10-08 NOTE — PROGRESS NOTES
Subjective   Patient ID: Veronique Garcia is a 74 y.o. female who presents for Sinus Problem (SORE THROAT, EAR DISCOMFORT, AND NASAL DRAINAGE X 1 MONTH.).    HPI PATIENT WITH CHRONIC BRONCHITIS  AND SEASONAL ALLERGIES     Review of Systems   Constitutional:  Negative for activity change, appetite change, fever and unexpected weight change.   HENT:  Positive for congestion, postnasal drip, rhinorrhea, sinus pressure and sinus pain. Negative for ear pain and sore throat.    Eyes:  Negative for discharge, itching and visual disturbance.   Respiratory:  Positive for cough and shortness of breath. Negative for wheezing.    Cardiovascular:  Negative for chest pain, palpitations and leg swelling.   Gastrointestinal:  Negative for abdominal pain, blood in stool, constipation, diarrhea, nausea and vomiting.   Endocrine: Negative for cold intolerance, heat intolerance and polyuria.   Genitourinary:  Negative for dysuria, flank pain and hematuria.   Musculoskeletal:  Negative for arthralgias, gait problem, joint swelling and myalgias.   Skin:  Negative for rash.   Allergic/Immunologic: Negative for environmental allergies and food allergies.   Neurological:  Negative for dizziness, syncope, weakness, light-headedness, numbness and headaches.   Psychiatric/Behavioral:  Negative for dysphoric mood and sleep disturbance. The patient is not nervous/anxious.        Objective   /72   Pulse 95   Temp 36.5 °C (97.7 °F)   Wt 64.4 kg (142 lb)   SpO2 97%   BMI 24.39 kg/m²     Physical Exam  Vitals and nursing note reviewed.   Constitutional:       Appearance: Normal appearance.   HENT:      Head: Normocephalic.      Nose: Congestion present.      Comments: ERYTHEMA  NASAL MUCOSA      Mouth/Throat:      Mouth: Mucous membranes are moist.      Pharynx: Posterior oropharyngeal erythema present.   Neck:      Comments: GLANDS TENDER IN HER NECK   Cardiovascular:      Rate and Rhythm: Normal rate and regular rhythm.      Pulses:  Normal pulses.      Heart sounds: Normal heart sounds. No murmur heard.     No friction rub. No gallop.   Pulmonary:      Effort: Pulmonary effort is normal. No respiratory distress.      Breath sounds: Normal breath sounds. No wheezing.   Abdominal:      General: Bowel sounds are normal. There is no distension.      Palpations: Abdomen is soft.      Tenderness: There is no abdominal tenderness.   Musculoskeletal:         General: No deformity. Normal range of motion.   Lymphadenopathy:      Cervical: Cervical adenopathy present.   Skin:     General: Skin is warm and dry.      Capillary Refill: Capillary refill takes less than 2 seconds.   Neurological:      General: No focal deficit present.      Mental Status: She is alert and oriented to person, place, and time.   Psychiatric:         Mood and Affect: Mood normal.         Assessment/Plan   Problem List Items Addressed This Visit    None  Visit Diagnoses         Codes    Acute non-recurrent maxillary sinusitis    -  Primary J01.00    Relevant Medications    doxycycline (Vibramycin) 100 mg capsule

## 2024-10-10 ASSESSMENT — ENCOUNTER SYMPTOMS
APPETITE CHANGE: 0
NAUSEA: 0
LIGHT-HEADEDNESS: 0
DIZZINESS: 0
DYSURIA: 0
MYALGIAS: 0
EYE DISCHARGE: 0
WHEEZING: 0
NERVOUS/ANXIOUS: 0
ACTIVITY CHANGE: 0
BLOOD IN STOOL: 0
HEADACHES: 0
ARTHRALGIAS: 0
DIARRHEA: 0
ABDOMINAL PAIN: 0
EYE ITCHING: 0
SORE THROAT: 0
WEAKNESS: 0
VOMITING: 0
PALPITATIONS: 0
CONSTIPATION: 0
SINUS PAIN: 1
SLEEP DISTURBANCE: 0
FLANK PAIN: 0
SINUS PRESSURE: 1
RHINORRHEA: 1
HEMATURIA: 0
SHORTNESS OF BREATH: 1
DYSPHORIC MOOD: 0
FEVER: 0
JOINT SWELLING: 0
NUMBNESS: 0
UNEXPECTED WEIGHT CHANGE: 0
COUGH: 1

## 2024-11-06 ENCOUNTER — OFFICE VISIT (OUTPATIENT)
Dept: PRIMARY CARE | Facility: CLINIC | Age: 74
End: 2024-11-06
Payer: COMMERCIAL

## 2024-11-06 VITALS
WEIGHT: 144 LBS | HEART RATE: 68 BPM | OXYGEN SATURATION: 97 % | BODY MASS INDEX: 24.74 KG/M2 | DIASTOLIC BLOOD PRESSURE: 70 MMHG | SYSTOLIC BLOOD PRESSURE: 132 MMHG | TEMPERATURE: 97.2 F

## 2024-11-06 DIAGNOSIS — R14.3 FLATULENCE: Primary | ICD-10-CM

## 2024-11-06 DIAGNOSIS — Z12.31 ENCOUNTER FOR SCREENING MAMMOGRAM FOR MALIGNANT NEOPLASM OF BREAST: ICD-10-CM

## 2024-11-06 PROCEDURE — 3075F SYST BP GE 130 - 139MM HG: CPT | Performed by: FAMILY MEDICINE

## 2024-11-06 PROCEDURE — 3078F DIAST BP <80 MM HG: CPT | Performed by: FAMILY MEDICINE

## 2024-11-06 PROCEDURE — 1159F MED LIST DOCD IN RCRD: CPT | Performed by: FAMILY MEDICINE

## 2024-11-06 PROCEDURE — 99214 OFFICE O/P EST MOD 30 MIN: CPT | Performed by: FAMILY MEDICINE

## 2024-11-06 RX ORDER — SIMETHICONE 125 MG
125 CAPSULE ORAL EVERY 6 HOURS PRN
Qty: 28 CAPSULE | Refills: 3 | Status: SHIPPED | OUTPATIENT
Start: 2024-11-06

## 2024-11-06 NOTE — PATIENT INSTRUCTIONS
FOLLOW WITH  DR EASLEY  ADDED MYLICON OR SIMETHICONE FOR THE EXCESS GAS   AVOID GAS FORMING FOODS LIKE THE BROCCOLI AND BEANS   ORDERED MAMMOGRAM

## 2024-11-15 ASSESSMENT — ENCOUNTER SYMPTOMS
DYSPHORIC MOOD: 0
EYE ITCHING: 0
MYALGIAS: 0
CONSTIPATION: 0
UNEXPECTED WEIGHT CHANGE: 0
DIZZINESS: 0
VOMITING: 0
NERVOUS/ANXIOUS: 0
HEMATURIA: 0
DIARRHEA: 0
LIGHT-HEADEDNESS: 0
ABDOMINAL PAIN: 0
EYE DISCHARGE: 0
SORE THROAT: 0
FLANK PAIN: 0
RHINORRHEA: 0
JOINT SWELLING: 0
NUMBNESS: 0
PALPITATIONS: 0
NAUSEA: 0
WEAKNESS: 0
APPETITE CHANGE: 0
BLOOD IN STOOL: 0
ACTIVITY CHANGE: 0
SLEEP DISTURBANCE: 0
HEADACHES: 0
DYSURIA: 0
ARTHRALGIAS: 0
COUGH: 0
FEVER: 0
SINUS PRESSURE: 0
SHORTNESS OF BREATH: 0
WHEEZING: 0
ABDOMINAL DISTENTION: 1

## 2024-11-25 DIAGNOSIS — E11.9 TYPE 2 DIABETES MELLITUS WITHOUT COMPLICATION, WITHOUT LONG-TERM CURRENT USE OF INSULIN (MULTI): Primary | ICD-10-CM

## 2024-11-25 RX ORDER — LANCETS 33 GAUGE
1 EACH MISCELLANEOUS 2 TIMES DAILY
Qty: 200 EACH | Refills: 3 | Status: SHIPPED | OUTPATIENT
Start: 2024-11-25

## 2024-11-27 ENCOUNTER — APPOINTMENT (OUTPATIENT)
Dept: PRIMARY CARE | Facility: CLINIC | Age: 74
End: 2024-11-27
Payer: COMMERCIAL

## 2024-11-27 VITALS
OXYGEN SATURATION: 96 % | WEIGHT: 138 LBS | BODY MASS INDEX: 23.71 KG/M2 | TEMPERATURE: 97 F | SYSTOLIC BLOOD PRESSURE: 132 MMHG | DIASTOLIC BLOOD PRESSURE: 72 MMHG | HEART RATE: 74 BPM

## 2024-11-27 DIAGNOSIS — E78.89 LIPIDS ABNORMAL: ICD-10-CM

## 2024-11-27 DIAGNOSIS — E11.9 TYPE 2 DIABETES MELLITUS WITHOUT COMPLICATION, WITHOUT LONG-TERM CURRENT USE OF INSULIN (MULTI): ICD-10-CM

## 2024-11-27 LAB
ALBUMIN SERPL BCP-MCNC: 4.4 G/DL (ref 3.4–5)
ALP SERPL-CCNC: 62 U/L (ref 33–136)
ALT SERPL W P-5'-P-CCNC: 43 U/L (ref 7–45)
ANION GAP SERPL CALC-SCNC: 14 MMOL/L (ref 10–20)
AST SERPL W P-5'-P-CCNC: 24 U/L (ref 9–39)
BILIRUB SERPL-MCNC: 1 MG/DL (ref 0–1.2)
BUN SERPL-MCNC: 11 MG/DL (ref 6–23)
CALCIUM SERPL-MCNC: 9.4 MG/DL (ref 8.6–10.3)
CHLORIDE SERPL-SCNC: 99 MMOL/L (ref 98–107)
CHOLEST SERPL-MCNC: 236 MG/DL (ref 0–199)
CHOLESTEROL/HDL RATIO: 6.1
CO2 SERPL-SCNC: 30 MMOL/L (ref 21–32)
CREAT SERPL-MCNC: 0.59 MG/DL (ref 0.5–1.05)
EGFRCR SERPLBLD CKD-EPI 2021: >90 ML/MIN/1.73M*2
GLUCOSE SERPL-MCNC: 104 MG/DL (ref 74–99)
HDLC SERPL-MCNC: 38.4 MG/DL
LDLC SERPL CALC-MCNC: 148 MG/DL
NON HDL CHOLESTEROL: 198 MG/DL (ref 0–149)
POC HEMOGLOBIN A1C: 7.5 % (ref 4.2–6.5)
POTASSIUM SERPL-SCNC: 4.3 MMOL/L (ref 3.5–5.3)
PROT SERPL-MCNC: 6.7 G/DL (ref 6.4–8.2)
SODIUM SERPL-SCNC: 139 MMOL/L (ref 136–145)
TRIGL SERPL-MCNC: 249 MG/DL (ref 0–149)
TSH SERPL-ACNC: 0.81 MIU/L (ref 0.44–3.98)
VLDL: 50 MG/DL (ref 0–40)

## 2024-11-27 PROCEDURE — 84443 ASSAY THYROID STIM HORMONE: CPT

## 2024-11-27 PROCEDURE — 83036 HEMOGLOBIN GLYCOSYLATED A1C: CPT | Performed by: FAMILY MEDICINE

## 2024-11-27 PROCEDURE — 3078F DIAST BP <80 MM HG: CPT | Performed by: FAMILY MEDICINE

## 2024-11-27 PROCEDURE — 3075F SYST BP GE 130 - 139MM HG: CPT | Performed by: FAMILY MEDICINE

## 2024-11-27 PROCEDURE — 80061 LIPID PANEL: CPT

## 2024-11-27 PROCEDURE — 99214 OFFICE O/P EST MOD 30 MIN: CPT | Performed by: FAMILY MEDICINE

## 2024-11-27 PROCEDURE — 80053 COMPREHEN METABOLIC PANEL: CPT

## 2024-11-27 ASSESSMENT — ENCOUNTER SYMPTOMS
CONSTIPATION: 0
COUGH: 0
ACTIVITY CHANGE: 0
SLEEP DISTURBANCE: 0
NERVOUS/ANXIOUS: 0
SORE THROAT: 0
ABDOMINAL PAIN: 0
PALPITATIONS: 0
DIZZINESS: 0
NAUSEA: 0
EYE ITCHING: 0
HEADACHES: 0
UNEXPECTED WEIGHT CHANGE: 0
HEMATURIA: 0
APPETITE CHANGE: 0
BLOOD IN STOOL: 0
SHORTNESS OF BREATH: 0
VOMITING: 0
EYE DISCHARGE: 0
DYSURIA: 0
FLANK PAIN: 0
DIARRHEA: 0
FEVER: 0
NUMBNESS: 0
DYSPHORIC MOOD: 0
SINUS PRESSURE: 0
MYALGIAS: 0
RHINORRHEA: 0
ARTHRALGIAS: 0
WEAKNESS: 0
JOINT SWELLING: 0
LIGHT-HEADEDNESS: 0
WHEEZING: 0

## 2024-11-27 NOTE — PROGRESS NOTES
Subjective   Patient ID: Veronique Garcia is a 74 y.o. female who presents for Diabetes (Last A1C was completed on 8/21/24 resulting in 9.3.  Today in office 7.5.) and Labs Only (TSH, Lipid, and CMP completed in the office as well.).    HPI DOING WELL WITH THE JANUVIA   HAS LOST SOME WEIGHT   MORE ACTIVE     Review of Systems   Constitutional:  Negative for activity change, appetite change, fever and unexpected weight change.   HENT:  Negative for congestion, ear pain, postnasal drip, rhinorrhea, sinus pressure and sore throat.    Eyes:  Negative for discharge, itching and visual disturbance.   Respiratory:  Negative for cough, shortness of breath and wheezing.    Cardiovascular:  Negative for chest pain, palpitations and leg swelling.   Gastrointestinal:  Negative for abdominal pain, blood in stool, constipation, diarrhea, nausea and vomiting.   Endocrine: Negative for cold intolerance, heat intolerance and polyuria.   Genitourinary:  Negative for dysuria, flank pain and hematuria.   Musculoskeletal:  Negative for arthralgias, gait problem, joint swelling and myalgias.   Skin:  Negative for rash.   Allergic/Immunologic: Negative for environmental allergies and food allergies.   Neurological:  Negative for dizziness, syncope, weakness, light-headedness, numbness and headaches.   Psychiatric/Behavioral:  Negative for dysphoric mood and sleep disturbance. The patient is not nervous/anxious.        Objective   /72   Pulse 74   Temp 36.1 °C (97 °F)   Wt 62.6 kg (138 lb)   SpO2 96%   BMI 23.71 kg/m²     Physical Exam  Vitals and nursing note reviewed.   Constitutional:       Appearance: Normal appearance.   HENT:      Head: Normocephalic.   Cardiovascular:      Rate and Rhythm: Normal rate and regular rhythm.      Pulses: Normal pulses.      Heart sounds: Normal heart sounds. No murmur heard.     No friction rub. No gallop.   Pulmonary:      Effort: Pulmonary effort is normal. No respiratory distress.       Breath sounds: Normal breath sounds. No wheezing.   Abdominal:      General: There is no distension.      Palpations: Abdomen is soft.      Tenderness: There is no abdominal tenderness.   Musculoskeletal:         General: No deformity. Normal range of motion.   Skin:     General: Skin is warm and dry.   Neurological:      General: No focal deficit present.      Mental Status: She is alert and oriented to person, place, and time.   Psychiatric:         Mood and Affect: Mood normal.         Assessment/Plan   Problem List Items Addressed This Visit             ICD-10-CM    Type 2 diabetes mellitus without complication, without long-term current use of insulin (Multi) E11.9    Relevant Orders    POCT glycosylated hemoglobin (Hb A1C) manually resulted (Completed)    TSH with reflex to Free T4 if abnormal    Lipids abnormal E78.89    Relevant Orders    Lipid Panel    Comprehensive metabolic panel    TSH with reflex to Free T4 if abnormal

## 2024-11-27 NOTE — PATIENT INSTRUCTIONS
SEE HERE IN 6 MONTHS FOR LABS AND A1C   HOLD THE METFORMIN FOR NOW  RESUME IF OVER 200 MOST CHECKS   JUST TAKE THE JANUVIA

## 2024-12-05 ENCOUNTER — TELEPHONE (OUTPATIENT)
Dept: PRIMARY CARE | Facility: CLINIC | Age: 74
End: 2024-12-05
Payer: COMMERCIAL

## 2024-12-05 NOTE — TELEPHONE ENCOUNTER
I spoke to Veronique and she is aware the results.  She would like to start a statin to help with her lipids.  She would like it sent to [facility].

## 2024-12-05 NOTE — TELEPHONE ENCOUNTER
----- Message from Michelle Mariee sent at 12/3/2024  1:07 PM EST -----  THYROID IS NORMAL RANGE  BLOOD SUGAR MUCH BETTER CONTROL  LIPID PANEL HOWEVER IS WORSE  WITH DIABETES NEEDS TO CONSDIER TAKING A STATIN DRUG AND OR INCREAING THIS   FOLLOW 6 MONHT S

## 2024-12-09 DIAGNOSIS — E78.2 MIXED HYPERLIPIDEMIA: ICD-10-CM

## 2024-12-09 DIAGNOSIS — E11.9 TYPE 2 DIABETES MELLITUS WITHOUT COMPLICATION, WITHOUT LONG-TERM CURRENT USE OF INSULIN (MULTI): Primary | ICD-10-CM

## 2024-12-09 RX ORDER — ATORVASTATIN CALCIUM 20 MG/1
20 TABLET, FILM COATED ORAL DAILY
Qty: 100 TABLET | Refills: 3 | Status: SHIPPED | OUTPATIENT
Start: 2024-12-09 | End: 2026-01-13

## 2025-01-02 ENCOUNTER — NUTRITION (OUTPATIENT)
Dept: NUTRITION | Facility: HOSPITAL | Age: 75
End: 2025-01-02
Payer: COMMERCIAL

## 2025-01-02 DIAGNOSIS — E11.9 TYPE 2 DIABETES MELLITUS WITHOUT COMPLICATION, WITHOUT LONG-TERM CURRENT USE OF INSULIN (MULTI): Primary | ICD-10-CM

## 2025-01-02 PROCEDURE — 97803 MED NUTRITION INDIV SUBSEQ: CPT

## 2025-01-02 NOTE — PROGRESS NOTES
Nutrition Assessment   Follow Up  Reason for Visit:  Veronique Garcia is a 74 y.o. female who presents for Diabetes (Follow Up)    Anthropometrics:  Anthropometrics  Weight:  (138lb per pt chart)  Weight Change  Weight History / % Weight Change: 11/6/24-11/17/24- 6lb loss in 3 weeks. Dietary changes.     Wt Readings from Last 10 Encounters:   11/27/24 62.6 kg (138 lb)   11/06/24 65.3 kg (144 lb)   10/08/24 64.4 kg (142 lb)   08/21/24 63.5 kg (140 lb)   05/24/24 64 kg (141 lb)   05/15/24 64.9 kg (143 lb)   04/25/24 64.2 kg (141 lb 8 oz)   02/13/24 64 kg (141 lb)   10/04/23 64.4 kg (142 lb)   09/12/23 65.3 kg (144 lb)     Food And Nutrient Intake:  Food and Nutrient History  Food and Nutrient History: Pt states that diet is going well, but got off track with the holidays. Most recent A1c was 7.5, previous A1c in August was 9.3. Pt is still measuring out foods and reducing added sugars. Pt is taking Metformin as needed and started Januvia. FBG has averaged 120-130 recently. Pt only takes Metformin when BG is over 200.  Energy Intake: Good > 75 %  Fluid Intake: Water with lemon, Diet coke or small amount of regular coke, coffee, tea  Food Allergy: NKFA  Food Intolerance: Milk/dairy  GI Symptoms: abdominal pain (Kidney stone)     Food Intake  Meal 1: Breakfst- egg and biscuit with turkey vila  Meal 2: Lunch- when eaten, leftovers from dinner  Meal 3: Dinner- chicken with protein pasta  Snacks: Snacks- Nuts with raisins, ocassional cookie     Micronutrient Intake  Vitamin Intake: Multivitamin     Medication and Complementary/Alternative Medicine Use  Prescription Medication Use: Metformin, Norvasc, Amitiza, Prilosec, Januvia, Lipitor    Current Outpatient Medications on File Prior to Visit   Medication Sig Dispense Refill    amLODIPine (Norvasc) 2.5 mg tablet Take 1 tablet (2.5 mg) by mouth once daily.      atorvastatin (Lipitor) 20 mg tablet Take 1 tablet (20 mg) by mouth once daily. 100 tablet 3    benzonatate  (Tessalon) 200 mg capsule Take 1 capsule (200 mg) by mouth every 8 hours if needed.      blood sugar diagnostic (OneTouch Verio test strips) strip 2 times a day. 60 strip 3    calcium carbonate (Tums) 200 mg calcium chewable tablet Chew.      cyclobenzaprine (Flexeril) 5 mg tablet Take 1 tablet (5 mg) by mouth once daily as needed for muscle spasms.      diphenhydrAMINE (Sominex) 25 mg tablet Take 1 tablet (25 mg) by mouth every 6 hours if needed for allergies.      EPINEPHrine (AUVI-Q) 0.1 mg/0.1mL inj auto-injector injection Inject as directed.      fluticasone (Flonase) 50 mcg/actuation nasal spray Administer 1 spray into each nostril 2 times a day. 16 g 2    LACTOBACILLUS ACIDOPHILUS ORAL Take 2 capsules by mouth twice a day.      lancets (OneTouch Delica Plus Lancet) 33 gauge misc 1 each 2 times a day. 200 each 3    loratadine (Claritin) 10 mg tablet Take 1 tablet (10 mg) by mouth once daily. 90 tablet 3    lubiprostone (Amitiza) 24 mcg capsule Take 1 capsule (24 mcg) by mouth 2 times daily (morning and late afternoon).      metFORMIN (Glucophage) 500 mg tablet Take 5 tablets (2,500 mg) by mouth 2 times a day. Take 2 tablets in the morning and 3 tablets by mouth daily with dinner daily 450 tablet 1    montelukast (Singulair) 4 mg chewable tablet Chew 1 tablet (4 mg) once daily at bedtime. 30 tablet 5    multivit-min/iron/folic/lutein (CENTRUM SILVER WOMEN ORAL) Take by mouth.      mv-mn/om3/dha/epa/fish/lut/lilliam (OCUVITE ADULT 50 PLUS ORAL) Take by mouth.      omeprazole (PriLOSEC) 20 mg DR capsule       polyethylene glycol (Glycolax, Miralax) 17 gram packet TAKE 1 PACKET BY MOUTH ONCE DAILY AS NEEDED FOR CONSTIPATION. DISSOLVE DOSE IN 4-8 OUNCES OF LIQUID AND TAKE AS DIRECTED      promethazine (Phenergan) 12.5 mg tablet TAKE 1 TABLET BY MOUTH EVERY 8 HOURS FOR UP TO 3 DAYS AS NEEDED FOR NAUSEA OR VOMITING      simethicone (Mylicon,Gas-X) 125 mg capsule Take 1 capsule (125 mg) by mouth every 6 hours if needed  for flatulence. 28 capsule 3    SITagliptin phosphate (Januvia) 25 mg tablet Take 1 tablet (25 mg) by mouth once daily. 30 tablet 11    Systane Complete 0.6 % drops INSTILL 1 DROP IN BOTH EYES FOUR TIMES DAILY      tumeric-ging-olive-oreg-capryl 100 mg-150 mg- 50 mg-150 mg capsule Take by mouth.      vit Y-z-hdkr-rut-quer-brom-net 100 mg-50 mg- 50 mg-200 mg capsule Take by mouth.       No current facility-administered medications on file prior to visit.     Nutrition Focused Physical Exam:  Subcutaneous Fat Loss  Orbital Fat Pads: Defer (Pt appears well nourished with good appetite.)  Muscle Wasting  Temporalis: Defer (Pt appears well nourished with good appetite.)    Energy Needs  Calculated Energy Needs Using Equations  Equation Chosen to Use by RD: Earnest Newman  Estimated Energy Needs  Total Energy Estimated Needs (kCal): 1565 kCal  Method for Estimating Needs: 25kcal/kg  Estimated Fluid Needs  Total Fluid Estimated Needs (mL): 1565 mL  Method for Estimating Needs: 1ml/kcal  Estimated Protein Needs  Total Protein Estimated Needs (g): 50 g  Method for Estimating Needs: 0.8g/kg actual BW      Nutrition Diagnosis      Nutrition Diagnosis  Patient has Nutrition Diagnosis: Yes  Diagnosis Status (1): Resolved  Nutrition Diagnosis 1: Excessive carbohydrate intake  Related to (1): physiological causes requiring use of modified carbohydrate intake  As Evidenced by (1): diet recall and A1c of 9.3.    Nutrition Interventions/Recommendations   Nutrition Prescription  Individualized Nutrition Prescription Provided for : Carbohydrate, Protein, and Fiber modified diet.  Food and Nutrition Delivery  Meals & Snacks: Carbohydrate-modified diet, Fiber-modified diet, Protein-modified diet  Goals: Pt will follow CHO counting/controlled diet, pairing CHO with protien and higher fiber sources of CHO.  Nutrition Education  Nutrition Education Content: Content related nutrition education  Goals: Patient goals- 1. Continue to follow  portion sizes for CHO counting. Consume no more than 45-60g CHO per meals. 2. Reduce sugary sweets to 2x/week following portion sizes.    Nutrition Monitoring and Evaluation   Food/Nutrient Related History Monitoring  Monitoring and Evaluation Plan: Carbohydrate intake, Fiber intake, Protein intake  Estimated protein intake: Estimated protein intake  Criteria: Pt will pair carbohydrate foods with protein foods to reduce BG spike.  Estimated carbohydrate intake: Estimated carbohydrate intake  Criteria: Pt will consume no more than 3-4 servings (45-60g) carbohydrates per meal, and no more then 1-1.5 servings (15-20g) carbohydrates per snack.  Estimated fiber intake: Estimated fiber intake  Criteria: Pt will consume higher fiber carbohydrate food options to reduce BG spike.  Biochemical Data, Medical Tests and Procedures  Monitoring and Evaluation Plan: Glucose/endocrine profile  Glucose/Endocrine Profile: Glucose, casual, Hemoglobin A1c (HgbA1c), Glucose, fasting  Criteria: Labs WNL    Time Spent  Time spent directly with patient, family or caregiver: 35 minutes  Documentation Time: 10 minutes        Readiness to Change : Good  Level of Understanding : Good  Anticipated Compliant : Good

## 2025-01-08 ENCOUNTER — APPOINTMENT (OUTPATIENT)
Dept: RADIOLOGY | Facility: HOSPITAL | Age: 75
End: 2025-01-08
Payer: COMMERCIAL

## 2025-01-13 NOTE — PROGRESS NOTES
"Subjective   Patient ID: Veronique Garcia is a 74 y.o. female who presents for Abdominal Pain (\"FOR AWHILE\").    HPI SAW HER GI SPECIALIST WHO GAVE HER BENTYL WHICH HAS HELPS   REPORTS EXCESS GAS     Review of Systems   Constitutional:  Negative for activity change, appetite change, fever and unexpected weight change.   HENT:  Negative for congestion, ear pain, postnasal drip, rhinorrhea, sinus pressure and sore throat.    Eyes:  Negative for discharge, itching and visual disturbance.   Respiratory:  Negative for cough, shortness of breath and wheezing.    Cardiovascular:  Negative for chest pain, palpitations and leg swelling.   Gastrointestinal:  Positive for abdominal distention. Negative for abdominal pain, blood in stool, constipation, diarrhea, nausea and vomiting.        EXCESS GAS    Endocrine: Negative for cold intolerance, heat intolerance and polyuria.   Genitourinary:  Negative for dysuria, flank pain and hematuria.   Musculoskeletal:  Negative for arthralgias, gait problem, joint swelling and myalgias.   Skin:  Negative for rash.   Allergic/Immunologic: Negative for environmental allergies and food allergies.   Neurological:  Negative for dizziness, syncope, weakness, light-headedness, numbness and headaches.   Psychiatric/Behavioral:  Negative for dysphoric mood and sleep disturbance. The patient is not nervous/anxious.        Objective   /70   Pulse 68   Temp 36.2 °C (97.2 °F)   Wt 65.3 kg (144 lb)   SpO2 97%   BMI 24.74 kg/m²     Physical Exam  Vitals and nursing note reviewed.   Constitutional:       Appearance: Normal appearance.   HENT:      Head: Normocephalic.   Cardiovascular:      Rate and Rhythm: Normal rate and regular rhythm.      Pulses: Normal pulses.      Heart sounds: Normal heart sounds. No murmur heard.     No friction rub. No gallop.   Pulmonary:      Effort: Pulmonary effort is normal. No respiratory distress.      Breath sounds: Normal breath sounds. No wheezing. " Medication:   Disp Refills Start End    buPROPion XL (WELLBUTRIN XL) 150 MG 24 hr tablet 90 tablet 3 12/20/2023 --    Sig - Route: Take 1 tablet by mouth every morning. - Oral    Sent to pharmacy as: buPROPion HCl ER (XL) 150 MG Oral Tablet Extended Release 24 Hour (WELLBUTRIN XL)     passed protocol.   Last office visit date:   Recent Visits  Date Type Provider Dept   05/01/24 Office Visit Faisal Esteves DO [facility]   Showing recent visits within past 365 days with a meds authorizing provider and meeting all other requirements    Next appointment scheduled?: No;    Number of refills given: 1     Abdominal:      General: Bowel sounds are normal. There is no distension.      Palpations: Abdomen is soft.      Tenderness: There is no abdominal tenderness.   Musculoskeletal:         General: No deformity. Normal range of motion.   Skin:     General: Skin is warm and dry.      Capillary Refill: Capillary refill takes less than 2 seconds.   Neurological:      General: No focal deficit present.      Mental Status: She is alert and oriented to person, place, and time.   Psychiatric:         Mood and Affect: Mood normal.         Assessment/Plan   Problem List Items Addressed This Visit    None  Visit Diagnoses         Codes    Flatulence    -  Primary R14.3    Relevant Medications    simethicone (Mylicon,Gas-X) 125 mg capsule    Encounter for screening mammogram for malignant neoplasm of breast     Z12.31    Relevant Orders    BI mammo bilateral screening tomosynthesis

## 2025-01-15 ENCOUNTER — APPOINTMENT (OUTPATIENT)
Dept: RADIOLOGY | Facility: HOSPITAL | Age: 75
End: 2025-01-15
Payer: COMMERCIAL

## 2025-02-17 ENCOUNTER — APPOINTMENT (OUTPATIENT)
Dept: RADIOLOGY | Facility: HOSPITAL | Age: 75
End: 2025-02-17
Payer: COMMERCIAL

## 2025-03-25 DIAGNOSIS — E11.9 TYPE 2 DIABETES MELLITUS WITHOUT COMPLICATION, WITHOUT LONG-TERM CURRENT USE OF INSULIN: ICD-10-CM

## 2025-03-25 RX ORDER — METFORMIN HYDROCHLORIDE 500 MG/1
2500 TABLET ORAL 2 TIMES DAILY
Qty: 450 TABLET | Refills: 1 | Status: SHIPPED | OUTPATIENT
Start: 2025-03-25

## 2025-04-01 ENCOUNTER — APPOINTMENT (OUTPATIENT)
Dept: PRIMARY CARE | Facility: CLINIC | Age: 75
End: 2025-04-01
Payer: COMMERCIAL

## 2025-04-01 VITALS
DIASTOLIC BLOOD PRESSURE: 76 MMHG | OXYGEN SATURATION: 99 % | HEART RATE: 56 BPM | BODY MASS INDEX: 24 KG/M2 | TEMPERATURE: 97 F | SYSTOLIC BLOOD PRESSURE: 132 MMHG | WEIGHT: 139.7 LBS

## 2025-04-01 DIAGNOSIS — E11.9 TYPE 2 DIABETES MELLITUS WITHOUT COMPLICATION, WITHOUT LONG-TERM CURRENT USE OF INSULIN: ICD-10-CM

## 2025-04-01 PROCEDURE — 1159F MED LIST DOCD IN RCRD: CPT | Performed by: FAMILY MEDICINE

## 2025-04-01 PROCEDURE — 3078F DIAST BP <80 MM HG: CPT | Performed by: FAMILY MEDICINE

## 2025-04-01 PROCEDURE — 3075F SYST BP GE 130 - 139MM HG: CPT | Performed by: FAMILY MEDICINE

## 2025-04-01 PROCEDURE — 99213 OFFICE O/P EST LOW 20 MIN: CPT | Performed by: FAMILY MEDICINE

## 2025-04-01 RX ORDER — BLOOD-GLUCOSE METER
EACH MISCELLANEOUS
Qty: 60 STRIP | Refills: 3 | Status: SHIPPED | OUTPATIENT
Start: 2025-04-01

## 2025-04-01 NOTE — PATIENT INSTRUCTIONS
I HOPE YOU ARE REASSURED THAT THIS MAMMOGRAM IS NORMAL  WE WILL DISCUSS NEXT MARCH AT [facility]

## 2025-04-01 NOTE — PROGRESS NOTES
Subjective   Patient ID: Veronique Garcia is a 74 y.o. female who presents for Follow-up (MAMMOGRAM RESULTS).    HPI NEW MAMMOGRAM REPORTS ARE INCLUDING HER RISK STATEMENT FOR DEVELOPING  BREAST CANCER AND TO HELP DETERMINE IF SHE WOULD DO AGAIN  EXPLAINED THIS IN DETAIL   APPARENTLY UNDERSTANDS     Review of SystemsSINCE THE  VISIT NO INTERVAL HISTORICAL CHANGES IN ANY OF THE 12 SYSTEMS REVIEWED OTHER THAN  CONCERN ABOUT BREAST CANCER     Objective   /76   Pulse 56   Temp 36.1 °C (97 °F)   Wt 63.4 kg (139 lb 11.2 oz)   SpO2 99%   BMI 24.00 kg/m²     Physical ExamSINCE RECENT VISIT NO INTERVAL PHYSICAL CHANGES IN ANY OF THE 12 SYSTEMS REVIEWED OTHER THAN CONCERN ABOUT BREAST CANCER     Assessment/Plan   Problem List Items Addressed This Visit             ICD-10-CM    Type 2 diabetes mellitus without complication, without long-term current use of insulin E11.9    Relevant Medications    blood sugar diagnostic (OneTouch Verio test strips)

## 2025-05-28 ENCOUNTER — APPOINTMENT (OUTPATIENT)
Dept: PRIMARY CARE | Facility: CLINIC | Age: 75
End: 2025-05-28
Payer: COMMERCIAL

## 2025-05-28 VITALS
DIASTOLIC BLOOD PRESSURE: 80 MMHG | TEMPERATURE: 97.5 F | SYSTOLIC BLOOD PRESSURE: 136 MMHG | OXYGEN SATURATION: 96 % | HEART RATE: 68 BPM | BODY MASS INDEX: 24.22 KG/M2 | WEIGHT: 141 LBS

## 2025-05-28 DIAGNOSIS — F60.7 DEPENDENT PERSONALITY DISORDER (MULTI): ICD-10-CM

## 2025-05-28 DIAGNOSIS — J43.9 PULMONARY EMPHYSEMA, UNSPECIFIED EMPHYSEMA TYPE (MULTI): ICD-10-CM

## 2025-05-28 DIAGNOSIS — E11.9 TYPE 2 DIABETES MELLITUS WITHOUT COMPLICATION, WITHOUT LONG-TERM CURRENT USE OF INSULIN: Primary | ICD-10-CM

## 2025-05-28 LAB
POC ALBUMIN /CREATININE RATIO MANUALLY ENTERED: ABNORMAL UG/MG CREAT
POC HEMOGLOBIN A1C: 8.7 % (ref 4.2–6.5)
POC URINE ALBUMIN: 30 MG/L
POC URINE CREATININE: 50 MG/DL

## 2025-05-28 PROCEDURE — 3052F HG A1C>EQUAL 8.0%<EQUAL 9.0%: CPT | Performed by: FAMILY MEDICINE

## 2025-05-28 PROCEDURE — 83036 HEMOGLOBIN GLYCOSYLATED A1C: CPT | Performed by: FAMILY MEDICINE

## 2025-05-28 PROCEDURE — 3079F DIAST BP 80-89 MM HG: CPT | Performed by: FAMILY MEDICINE

## 2025-05-28 PROCEDURE — 99214 OFFICE O/P EST MOD 30 MIN: CPT | Performed by: FAMILY MEDICINE

## 2025-05-28 PROCEDURE — 1159F MED LIST DOCD IN RCRD: CPT | Performed by: FAMILY MEDICINE

## 2025-05-28 PROCEDURE — 1036F TOBACCO NON-USER: CPT | Performed by: FAMILY MEDICINE

## 2025-05-28 PROCEDURE — 82044 UR ALBUMIN SEMIQUANTITATIVE: CPT | Performed by: FAMILY MEDICINE

## 2025-05-28 PROCEDURE — 3075F SYST BP GE 130 - 139MM HG: CPT | Performed by: FAMILY MEDICINE

## 2025-05-28 ASSESSMENT — ENCOUNTER SYMPTOMS
DYSPHORIC MOOD: 0
SORE THROAT: 0
NAUSEA: 0
HEMATURIA: 0
ABDOMINAL PAIN: 0
POLYPHAGIA: 1
DYSURIA: 0
POLYDIPSIA: 1
RHINORRHEA: 0
FEVER: 0
PALPITATIONS: 0
VOMITING: 0
HEADACHES: 0
SINUS PRESSURE: 0
LIGHT-HEADEDNESS: 0
FLANK PAIN: 0
UNEXPECTED WEIGHT CHANGE: 0
DIZZINESS: 0
DIARRHEA: 0
APPETITE CHANGE: 0
EYE DISCHARGE: 0
MYALGIAS: 0
JOINT SWELLING: 0
CONSTIPATION: 0
WEAKNESS: 0
WHEEZING: 0
COUGH: 1
SHORTNESS OF BREATH: 0
EYE ITCHING: 0
SLEEP DISTURBANCE: 0
NUMBNESS: 0
ACTIVITY CHANGE: 0
ARTHRALGIAS: 0
BLOOD IN STOOL: 0
NERVOUS/ANXIOUS: 1

## 2025-05-28 NOTE — PATIENT INSTRUCTIONS
"DIABETES IS NOT WELL CONTROLLED   TAKING MEDICATION   POST PRANDIAL 30-60\" AND CHECK A SUGAR  INCREASE THE JANUVIA TO 50 MG ONCE DAILY  OF THE ONES YOU HAVE TAKE TWO UNTIL THEY ARE GONE    SEE HERE IN 3 MONTHS   "

## 2025-05-28 NOTE — PROGRESS NOTES
"Subjective   Patient ID: Veronique Garcia is a 75 y.o. female who presents for Diabetes (Last A1C was completed on 11/27/25 resulting in 7.5.  Today in the office 8.7.) and Sinus Problem (C/O thinking she may be getting a sinus infection.  Symptoms currently: pressure behind the R eye, headaches, sinus drainage).  HPI FEELS CONGESTED BUT BETTER \"ALLERGIES\"    Review of Systems   Constitutional:  Negative for activity change, appetite change, fever and unexpected weight change.   HENT:  Positive for congestion and postnasal drip. Negative for ear pain, rhinorrhea, sinus pressure and sore throat.    Eyes:  Negative for discharge, itching and visual disturbance.   Respiratory:  Positive for cough. Negative for shortness of breath and wheezing.    Cardiovascular:  Negative for chest pain, palpitations and leg swelling.   Gastrointestinal:  Negative for abdominal pain, blood in stool, constipation, diarrhea, nausea and vomiting.   Endocrine: Positive for polydipsia, polyphagia and polyuria. Negative for cold intolerance and heat intolerance.   Genitourinary:  Negative for dysuria, flank pain and hematuria.   Musculoskeletal:  Negative for arthralgias, gait problem, joint swelling and myalgias.   Skin:  Negative for rash.   Allergic/Immunologic: Negative for environmental allergies and food allergies.   Neurological:  Negative for dizziness, syncope, weakness, light-headedness, numbness and headaches.   Psychiatric/Behavioral:  Negative for dysphoric mood and sleep disturbance. The patient is nervous/anxious.            8/21/2024    10:22 AM 10/8/2024    10:04 AM 11/6/2024    12:04 PM 11/27/2024    10:38 AM 1/2/2025    10:35 AM 4/1/2025    10:16 AM 5/28/2025     9:23 AM   Vitals   Systolic 156 130 132 132  132 136   Diastolic 84 72 70 72  76 80   Heart Rate 58 95 68 74  56 68   Temp 36.1 °C (96.9 °F) 36.5 °C (97.7 °F) 36.2 °C (97.2 °F) 36.1 °C (97 °F)  36.1 °C (97 °F) 36.4 °C (97.5 °F)   Weight (lb) 140 142 144 138 -- " 139.7 141   BMI 24.05 kg/m2 24.39 kg/m2 24.74 kg/m2 23.71 kg/m2  24 kg/m2 24.22 kg/m2   BSA (m2) 1.69 m2 1.7 m2 1.72 m2 1.68 m2  1.69 m2 1.7 m2   Visit Report Report Report Report Report  Report Report       Body mass index is 24.22 kg/m².      Objective   Physical Exam  Vitals and nursing note reviewed.   Constitutional:       Appearance: Normal appearance.   HENT:      Head: Normocephalic.   Cardiovascular:      Rate and Rhythm: Normal rate and regular rhythm.      Pulses: Normal pulses.      Heart sounds: Normal heart sounds. No murmur heard.     No friction rub. No gallop.   Pulmonary:      Effort: Pulmonary effort is normal. No respiratory distress.      Breath sounds: Normal breath sounds. No wheezing.   Abdominal:      General: There is no distension.      Tenderness: There is no abdominal tenderness.   Musculoskeletal:         General: No deformity. Normal range of motion.   Skin:     General: Skin is warm and dry.      Capillary Refill: Capillary refill takes less than 2 seconds.   Neurological:      General: No focal deficit present.      Mental Status: She is alert and oriented to person, place, and time.   Psychiatric:         Mood and Affect: Mood normal.      Comments: ANXIOUS          Assessment/Plan   Problem List Items Addressed This Visit       Type 2 diabetes mellitus without complication, without long-term current use of insulin - Primary    Relevant Medications    SITagliptin phosphate (Januvia) 50 mg tablet    Other Relevant Orders    POCT glycosylated hemoglobin (Hb A1C) manually resulted (Completed)    POCT Albumin Random Urine manually resulted (Completed)    Albumin-Creatinine Ratio, Urine Random    Dependent personality disorder (Multi)    SEEING COUNSELOR AT [facility]          Pulmonary emphysema, unspecified emphysema type (Multi)    STABLE AND FOLLOWING WITH HER PULMONOLOGIST                Michelle Mariee DO

## 2025-05-30 LAB
ALBUMIN/CREAT UR: 87 MG/G CREAT
CREAT UR-MCNC: 55 MG/DL (ref 20–275)
MICROALBUMIN UR-MCNC: 4.8 MG/DL

## 2025-06-13 ENCOUNTER — APPOINTMENT (OUTPATIENT)
Dept: PRIMARY CARE | Facility: CLINIC | Age: 75
End: 2025-06-13
Payer: COMMERCIAL

## 2025-06-13 VITALS
OXYGEN SATURATION: 95 % | WEIGHT: 143.3 LBS | TEMPERATURE: 97.4 F | HEART RATE: 80 BPM | SYSTOLIC BLOOD PRESSURE: 140 MMHG | BODY MASS INDEX: 24.62 KG/M2 | DIASTOLIC BLOOD PRESSURE: 70 MMHG

## 2025-06-13 DIAGNOSIS — J40 BRONCHITIS: ICD-10-CM

## 2025-06-13 DIAGNOSIS — J01.01 ACUTE RECURRENT MAXILLARY SINUSITIS: Primary | ICD-10-CM

## 2025-06-13 PROCEDURE — 3078F DIAST BP <80 MM HG: CPT | Performed by: FAMILY MEDICINE

## 2025-06-13 PROCEDURE — 3052F HG A1C>EQUAL 8.0%<EQUAL 9.0%: CPT | Performed by: FAMILY MEDICINE

## 2025-06-13 PROCEDURE — 99213 OFFICE O/P EST LOW 20 MIN: CPT | Performed by: FAMILY MEDICINE

## 2025-06-13 PROCEDURE — 3077F SYST BP >= 140 MM HG: CPT | Performed by: FAMILY MEDICINE

## 2025-06-13 RX ORDER — DICYCLOMINE HYDROCHLORIDE 20 MG/1
20 TABLET ORAL AS NEEDED
COMMUNITY

## 2025-06-13 RX ORDER — MONTELUKAST SODIUM 4 MG/1
4 TABLET, CHEWABLE ORAL NIGHTLY
Qty: 30 TABLET | Refills: 5 | Status: SHIPPED | OUTPATIENT
Start: 2025-06-13 | End: 2025-12-10

## 2025-06-13 RX ORDER — AMOXICILLIN AND CLAVULANATE POTASSIUM 875; 125 MG/1; MG/1
875 TABLET, FILM COATED ORAL 2 TIMES DAILY
Qty: 20 TABLET | Refills: 0 | Status: SHIPPED | OUTPATIENT
Start: 2025-06-13 | End: 2025-06-23

## 2025-06-13 ASSESSMENT — ENCOUNTER SYMPTOMS
SINUS PAIN: 1
SINUS PRESSURE: 1
RHINORRHEA: 1

## 2025-06-13 NOTE — PROGRESS NOTES
Subjective   Patient ID: Veronique Garcia is a 75 y.o. female who presents for Sinusitis (Veronique is here for a sinus infection, right eye been hurting and drainage/).  Sinusitis  Associated symptoms include congestion and sinus pressure. Pertinent negatives include no coughing, ear pain, headaches, shortness of breath or sore throat.       Review of Systems   Constitutional:  Negative for activity change, appetite change, fever and unexpected weight change.   HENT:  Positive for congestion, postnasal drip, rhinorrhea, sinus pressure and sinus pain. Negative for ear pain and sore throat.    Eyes:  Negative for discharge, itching and visual disturbance.   Respiratory:  Negative for cough, shortness of breath and wheezing.    Cardiovascular:  Negative for chest pain, palpitations and leg swelling.   Gastrointestinal:  Negative for abdominal pain, blood in stool, constipation, diarrhea, nausea and vomiting.   Endocrine: Negative for cold intolerance, heat intolerance and polyuria.   Genitourinary:  Negative for dysuria, flank pain and hematuria.   Musculoskeletal:  Negative for arthralgias, gait problem, joint swelling and myalgias.   Skin:  Negative for rash.   Allergic/Immunologic: Negative for environmental allergies and food allergies.   Neurological:  Negative for dizziness, syncope, weakness, light-headedness, numbness and headaches.   Psychiatric/Behavioral:  Negative for dysphoric mood and sleep disturbance. The patient is not nervous/anxious.            10/8/2024    10:04 AM 11/6/2024    12:04 PM 11/27/2024    10:38 AM 1/2/2025    10:35 AM 4/1/2025    10:16 AM 5/28/2025     9:23 AM 6/13/2025    11:32 AM   Vitals   Systolic 130 132 132  132 136 140   Diastolic 72 70 72  76 80 70   Heart Rate 95 68 74  56 68 80   Temp 36.5 °C (97.7 °F) 36.2 °C (97.2 °F) 36.1 °C (97 °F)  36.1 °C (97 °F) 36.4 °C (97.5 °F) 36.3 °C (97.4 °F)   Weight (lb) 142 144 138 -- 139.7 141 143.3   BMI 24.39 kg/m2 24.74 kg/m2 23.71 kg/m2   24 kg/m2 24.22 kg/m2 24.62 kg/m2   BSA (m2) 1.7 m2 1.72 m2 1.68 m2  1.69 m2 1.7 m2 1.71 m2   Visit Report Report Report Report  Report Report Report       Body mass index is 24.62 kg/m².      Objective   Physical Exam  Vitals and nursing note reviewed.   Constitutional:       Appearance: Normal appearance. She is ill-appearing.   HENT:      Head: Normocephalic.      Right Ear: Tympanic membrane normal.      Left Ear: Tympanic membrane normal.      Nose: Congestion and rhinorrhea present.      Comments: ERYTHEMA BOTH SIDES      Mouth/Throat:      Mouth: Mucous membranes are dry.   Cardiovascular:      Rate and Rhythm: Normal rate and regular rhythm.      Pulses: Normal pulses.      Heart sounds: Normal heart sounds. No murmur heard.     No friction rub. No gallop.   Pulmonary:      Effort: Pulmonary effort is normal. No respiratory distress.      Breath sounds: Rhonchi present. No wheezing.   Abdominal:      General: There is no distension.      Tenderness: There is no abdominal tenderness.   Musculoskeletal:         General: No deformity. Normal range of motion.   Skin:     General: Skin is warm and dry.   Neurological:      General: No focal deficit present.      Mental Status: She is alert and oriented to person, place, and time.   Psychiatric:         Mood and Affect: Mood normal.         Assessment/Plan   Problem List Items Addressed This Visit       Bronchitis    Relevant Medications    montelukast (Singulair) 4 mg chewable tablet     Other Visit Diagnoses         Acute recurrent maxillary sinusitis    -  Primary    Relevant Medications    amoxicillin-clavulanate (Augmentin) 875-125 mg tablet               Michelle Mariee DO

## 2025-06-18 ASSESSMENT — ENCOUNTER SYMPTOMS
SLEEP DISTURBANCE: 0
PALPITATIONS: 0
FEVER: 0
MYALGIAS: 0
JOINT SWELLING: 0
WHEEZING: 0
DIARRHEA: 0
FLANK PAIN: 0
UNEXPECTED WEIGHT CHANGE: 0
HEADACHES: 0
WEAKNESS: 0
ABDOMINAL PAIN: 0
DYSPHORIC MOOD: 0
CONSTIPATION: 0
LIGHT-HEADEDNESS: 0
SORE THROAT: 0
BLOOD IN STOOL: 0
NUMBNESS: 0
APPETITE CHANGE: 0
EYE DISCHARGE: 0
DIZZINESS: 0
EYE ITCHING: 0
ARTHRALGIAS: 0
HEMATURIA: 0
COUGH: 0
ACTIVITY CHANGE: 0
NAUSEA: 0
DYSURIA: 0
NERVOUS/ANXIOUS: 0
SHORTNESS OF BREATH: 0
VOMITING: 0

## 2025-07-07 ENCOUNTER — APPOINTMENT (OUTPATIENT)
Dept: PRIMARY CARE | Facility: CLINIC | Age: 75
End: 2025-07-07
Payer: COMMERCIAL

## 2025-07-08 ENCOUNTER — OFFICE VISIT (OUTPATIENT)
Dept: PRIMARY CARE | Facility: CLINIC | Age: 75
End: 2025-07-08
Payer: COMMERCIAL

## 2025-07-08 VITALS
SYSTOLIC BLOOD PRESSURE: 140 MMHG | WEIGHT: 140 LBS | TEMPERATURE: 96.1 F | OXYGEN SATURATION: 94 % | HEART RATE: 80 BPM | BODY MASS INDEX: 24.05 KG/M2 | DIASTOLIC BLOOD PRESSURE: 70 MMHG

## 2025-07-08 DIAGNOSIS — J40 BRONCHITIS: ICD-10-CM

## 2025-07-08 DIAGNOSIS — N76.0 VAGINOSIS: Primary | ICD-10-CM

## 2025-07-08 PROCEDURE — 3078F DIAST BP <80 MM HG: CPT | Performed by: FAMILY MEDICINE

## 2025-07-08 PROCEDURE — 3077F SYST BP >= 140 MM HG: CPT | Performed by: FAMILY MEDICINE

## 2025-07-08 PROCEDURE — 3052F HG A1C>EQUAL 8.0%<EQUAL 9.0%: CPT | Performed by: FAMILY MEDICINE

## 2025-07-08 PROCEDURE — 1159F MED LIST DOCD IN RCRD: CPT | Performed by: FAMILY MEDICINE

## 2025-07-08 PROCEDURE — 1036F TOBACCO NON-USER: CPT | Performed by: FAMILY MEDICINE

## 2025-07-08 PROCEDURE — 99213 OFFICE O/P EST LOW 20 MIN: CPT | Performed by: FAMILY MEDICINE

## 2025-07-08 RX ORDER — MONTELUKAST SODIUM 4 MG/1
4 TABLET, CHEWABLE ORAL 2 TIMES DAILY
Qty: 60 TABLET | Refills: 5 | Status: SHIPPED | OUTPATIENT
Start: 2025-07-08 | End: 2026-01-04

## 2025-07-08 RX ORDER — FLUCONAZOLE 150 MG/1
150 TABLET ORAL ONCE
Qty: 1 TABLET | Refills: 0 | Status: SHIPPED | OUTPATIENT
Start: 2025-07-08 | End: 2025-07-08

## 2025-07-08 NOTE — PROGRESS NOTES
Subjective   Patient ID: Veronique Garcia is a 75 y.o. female who presents for Vaginitis/Bacterial Vaginosis (Veronique is here for possible yeast infection, pain,swelling and discharge on the area).  HPI  IN UC AND VAG SWAB DONE  PERSISTS WITH VAGINAL DISCHARGE AND ITCHING   C&S WAS NEGATIVE SO NO TREATMENT OFFERED   Review of SystemsSINCE THE  VISIT NO INTERVAL HISTORICAL CHANGES IN ANY OF THE 12 SYSTEMS REVIEWED OTHER THAN VAGINAL ITCHING AND DISCHARGE         11/6/2024    12:04 PM 11/27/2024    10:38 AM 1/2/2025    10:35 AM 4/1/2025    10:16 AM 5/28/2025     9:23 AM 6/13/2025    11:32 AM 7/8/2025     9:22 AM   Vitals   Systolic 132 132  132 136 140 140   Diastolic 70 72  76 80 70 70   Heart Rate 68 74  56 68 80 80   Temp 36.2 °C (97.2 °F) 36.1 °C (97 °F)  36.1 °C (97 °F) 36.4 °C (97.5 °F) 36.3 °C (97.4 °F) 35.6 °C (96.1 °F)   Weight (lb) 144 138 -- 139.7 141 143.3 140   BMI 24.74 kg/m2 23.71 kg/m2  24 kg/m2 24.22 kg/m2 24.62 kg/m2 24.05 kg/m2   BSA (m2) 1.72 m2 1.68 m2  1.69 m2 1.7 m2 1.71 m2 1.69 m2   Visit Report Report Report  Report Report Report Report       Body mass index is 24.05 kg/m².      Objective   Physical ExamSINCE RECENT VISIT NO INTERVAL PHYSICAL CHANGES IN ANY OF THE 12 SYSTEMS REVIEWED OTHER THAN VAGINAL ITCHING AND DISCHARGE     Assessment/Plan   Problem List Items Addressed This Visit       Bronchitis    Relevant Medications    montelukast (Singulair) 4 mg chewable tablet     Other Visit Diagnoses         Vaginosis    -  Primary    Relevant Medications    fluconazole (Diflucan) 150 mg tablet               Michelle Mariee DO

## 2025-07-11 DIAGNOSIS — E11.9 TYPE 2 DIABETES MELLITUS WITHOUT COMPLICATION, WITHOUT LONG-TERM CURRENT USE OF INSULIN: ICD-10-CM

## 2025-07-11 DIAGNOSIS — J40 BRONCHITIS: ICD-10-CM

## 2025-07-11 RX ORDER — MONTELUKAST SODIUM 10 MG/1
10 TABLET ORAL NIGHTLY
Qty: 90 TABLET | Refills: 1 | Status: SHIPPED | OUTPATIENT
Start: 2025-07-11

## 2025-07-11 RX ORDER — MONTELUKAST SODIUM 10 MG/1
10 TABLET ORAL NIGHTLY
COMMUNITY
End: 2025-07-11 | Stop reason: SDUPTHER

## 2025-07-11 RX ORDER — BLOOD-GLUCOSE METER
EACH MISCELLANEOUS
Qty: 200 STRIP | Refills: 1 | Status: SHIPPED | OUTPATIENT
Start: 2025-07-11

## 2025-07-24 DIAGNOSIS — E11.9 TYPE 2 DIABETES MELLITUS WITHOUT COMPLICATION, WITHOUT LONG-TERM CURRENT USE OF INSULIN: ICD-10-CM

## 2025-07-25 RX ORDER — LANCETS 33 GAUGE
1 EACH MISCELLANEOUS 2 TIMES DAILY
Qty: 200 EACH | Refills: 3 | Status: SHIPPED | OUTPATIENT
Start: 2025-07-25

## 2025-08-25 ENCOUNTER — APPOINTMENT (OUTPATIENT)
Dept: PRIMARY CARE | Facility: CLINIC | Age: 75
End: 2025-08-25
Payer: COMMERCIAL

## 2025-08-25 VITALS
SYSTOLIC BLOOD PRESSURE: 128 MMHG | BODY MASS INDEX: 25.08 KG/M2 | WEIGHT: 146 LBS | TEMPERATURE: 98 F | OXYGEN SATURATION: 97 % | DIASTOLIC BLOOD PRESSURE: 80 MMHG | HEART RATE: 80 BPM

## 2025-08-25 DIAGNOSIS — E11.9 TYPE 2 DIABETES MELLITUS WITHOUT COMPLICATION, WITHOUT LONG-TERM CURRENT USE OF INSULIN: Primary | ICD-10-CM

## 2025-08-25 DIAGNOSIS — Z78.0 POST-MENOPAUSAL: ICD-10-CM

## 2025-08-25 PROCEDURE — 3074F SYST BP LT 130 MM HG: CPT | Performed by: FAMILY MEDICINE

## 2025-08-25 PROCEDURE — 1159F MED LIST DOCD IN RCRD: CPT | Performed by: FAMILY MEDICINE

## 2025-08-25 PROCEDURE — 3079F DIAST BP 80-89 MM HG: CPT | Performed by: FAMILY MEDICINE

## 2025-08-25 PROCEDURE — 3052F HG A1C>EQUAL 8.0%<EQUAL 9.0%: CPT | Performed by: FAMILY MEDICINE

## 2025-08-25 PROCEDURE — 99213 OFFICE O/P EST LOW 20 MIN: CPT | Performed by: FAMILY MEDICINE

## 2025-08-25 RX ORDER — TENAPANOR HYDROCHLORIDE 53.2 MG/1
50 TABLET ORAL 2 TIMES DAILY
COMMUNITY
Start: 2025-08-22 | End: 2026-08-22

## 2025-08-25 RX ORDER — DEXTROSE 4 G
1 TABLET,CHEWABLE ORAL 2 TIMES DAILY
Qty: 1 EACH | Refills: 0 | Status: SHIPPED | OUTPATIENT
Start: 2025-08-25

## 2025-08-25 RX ORDER — BLOOD SUGAR DIAGNOSTIC
1 STRIP MISCELLANEOUS 2 TIMES DAILY
Qty: 60 STRIP | Refills: 1 | Status: SHIPPED | OUTPATIENT
Start: 2025-08-25 | End: 2026-02-21

## 2025-08-25 SDOH — ECONOMIC STABILITY: TRANSPORTATION INSECURITY
IN THE PAST 12 MONTHS, HAS LACK OF TRANSPORTATION KEPT YOU FROM MEETINGS, WORK, OR FROM GETTING THINGS NEEDED FOR DAILY LIVING?: YES

## 2025-08-25 SDOH — ECONOMIC STABILITY: TRANSPORTATION INSECURITY
IN THE PAST 12 MONTHS, HAS THE LACK OF TRANSPORTATION KEPT YOU FROM MEDICAL APPOINTMENTS OR FROM GETTING MEDICATIONS?: YES

## 2025-08-25 ASSESSMENT — ENCOUNTER SYMPTOMS
MYALGIAS: 0
ARTHRALGIAS: 0
HEADACHES: 0
ACTIVITY CHANGE: 0
SLEEP DISTURBANCE: 0
DYSURIA: 0
FLANK PAIN: 0
WHEEZING: 0
FEVER: 0
SINUS PRESSURE: 0
DIARRHEA: 0
NAUSEA: 0
SHORTNESS OF BREATH: 0
NERVOUS/ANXIOUS: 0
VOMITING: 0
ABDOMINAL PAIN: 0
APPETITE CHANGE: 0
EYE ITCHING: 0
JOINT SWELLING: 0
HEMATURIA: 0
RHINORRHEA: 1
BLOOD IN STOOL: 0
NUMBNESS: 0
PALPITATIONS: 0
COUGH: 0
LIGHT-HEADEDNESS: 0
CONSTIPATION: 0
EYE DISCHARGE: 0
UNEXPECTED WEIGHT CHANGE: 0
DYSPHORIC MOOD: 0
SORE THROAT: 1
DIZZINESS: 0
WEAKNESS: 0

## 2025-08-25 ASSESSMENT — PATIENT HEALTH QUESTIONNAIRE - PHQ9
1. LITTLE INTEREST OR PLEASURE IN DOING THINGS: NOT AT ALL
2. FEELING DOWN, DEPRESSED OR HOPELESS: NOT AT ALL
SUM OF ALL RESPONSES TO PHQ9 QUESTIONS 1 AND 2: 0

## 2025-08-26 DIAGNOSIS — E11.9 TYPE 2 DIABETES MELLITUS WITHOUT COMPLICATION, WITHOUT LONG-TERM CURRENT USE OF INSULIN: ICD-10-CM

## 2025-08-26 RX ORDER — LANCETS
EACH MISCELLANEOUS
Qty: 100 EACH | Refills: 3 | Status: SHIPPED | OUTPATIENT
Start: 2025-08-26

## 2025-08-29 ENCOUNTER — TELEPHONE (OUTPATIENT)
Dept: PRIMARY CARE | Facility: CLINIC | Age: 75
End: 2025-08-29
Payer: COMMERCIAL

## 2025-09-05 ENCOUNTER — APPOINTMENT (OUTPATIENT)
Dept: PRIMARY CARE | Facility: CLINIC | Age: 75
End: 2025-09-05
Payer: COMMERCIAL

## 2025-09-29 ENCOUNTER — APPOINTMENT (OUTPATIENT)
Dept: PRIMARY CARE | Facility: CLINIC | Age: 75
End: 2025-09-29
Payer: COMMERCIAL

## 2025-10-06 ENCOUNTER — APPOINTMENT (OUTPATIENT)
Dept: PRIMARY CARE | Facility: CLINIC | Age: 75
End: 2025-10-06
Payer: COMMERCIAL